# Patient Record
Sex: FEMALE | Race: WHITE | Employment: OTHER | URBAN - METROPOLITAN AREA
[De-identification: names, ages, dates, MRNs, and addresses within clinical notes are randomized per-mention and may not be internally consistent; named-entity substitution may affect disease eponyms.]

---

## 2020-01-01 ENCOUNTER — APPOINTMENT (OUTPATIENT)
Dept: CT IMAGING | Age: 85
DRG: 301 | End: 2020-01-01
Payer: MEDICARE

## 2020-01-01 ENCOUNTER — HOSPITAL ENCOUNTER (INPATIENT)
Age: 85
LOS: 1 days | Discharge: HOSPICE | DRG: 301 | End: 2020-07-08
Attending: EMERGENCY MEDICINE | Admitting: INTERNAL MEDICINE
Payer: MEDICARE

## 2020-01-01 ENCOUNTER — HOSPITAL ENCOUNTER (INPATIENT)
Age: 85
LOS: 2 days | End: 2020-07-10
Attending: INTERNAL MEDICINE | Admitting: INTERNAL MEDICINE

## 2020-01-01 ENCOUNTER — APPOINTMENT (OUTPATIENT)
Dept: GENERAL RADIOLOGY | Age: 85
DRG: 301 | End: 2020-01-01
Attending: EMERGENCY MEDICINE
Payer: MEDICARE

## 2020-01-01 ENCOUNTER — HOSPITAL ENCOUNTER (INPATIENT)
Age: 85
LOS: 1 days | Discharge: ANOTHER HOSPITAL | DRG: 301 | End: 2020-07-07
Attending: INTERNAL MEDICINE | Admitting: INTERNAL MEDICINE
Payer: MEDICARE

## 2020-01-01 ENCOUNTER — HOSPITAL ENCOUNTER (EMERGENCY)
Age: 85
Discharge: HOME | End: 2020-06-16
Attending: EMERGENCY MEDICINE
Payer: MEDICARE

## 2020-01-01 ENCOUNTER — PATIENT OUTREACH (OUTPATIENT)
Dept: CASE MANAGEMENT | Age: 85
End: 2020-01-01

## 2020-01-01 ENCOUNTER — APPOINTMENT (OUTPATIENT)
Dept: GENERAL RADIOLOGY | Age: 85
End: 2020-01-01
Attending: EMERGENCY MEDICINE
Payer: MEDICARE

## 2020-01-01 ENCOUNTER — HOSPITAL ENCOUNTER (OUTPATIENT)
Dept: GENERAL RADIOLOGY | Age: 85
Discharge: HOME | End: 2020-02-13
Attending: INTERNAL MEDICINE
Payer: MEDICARE

## 2020-01-01 ENCOUNTER — HOSPICE ADMISSION (OUTPATIENT)
Dept: HOSPICE | Facility: HOSPICE | Age: 85
End: 2020-01-01
Payer: MEDICARE

## 2020-01-01 VITALS
TEMPERATURE: 98.2 F | SYSTOLIC BLOOD PRESSURE: 117 MMHG | WEIGHT: 180 LBS | RESPIRATION RATE: 16 BRPM | DIASTOLIC BLOOD PRESSURE: 66 MMHG | HEART RATE: 57 BPM | HEIGHT: 62 IN | OXYGEN SATURATION: 95 % | BODY MASS INDEX: 33.13 KG/M2

## 2020-01-01 VITALS
DIASTOLIC BLOOD PRESSURE: 61 MMHG | OXYGEN SATURATION: 93 % | WEIGHT: 179.9 LBS | HEART RATE: 68 BPM | SYSTOLIC BLOOD PRESSURE: 151 MMHG | TEMPERATURE: 98.4 F | HEIGHT: 62 IN | RESPIRATION RATE: 16 BRPM | BODY MASS INDEX: 33.1 KG/M2

## 2020-01-01 VITALS
SYSTOLIC BLOOD PRESSURE: 140 MMHG | TEMPERATURE: 99.9 F | RESPIRATION RATE: 20 BRPM | HEART RATE: 89 BPM | DIASTOLIC BLOOD PRESSURE: 69 MMHG

## 2020-01-01 VITALS
HEART RATE: 68 BPM | BODY MASS INDEX: 28.25 KG/M2 | OXYGEN SATURATION: 98 % | RESPIRATION RATE: 16 BRPM | WEIGHT: 180 LBS | TEMPERATURE: 98.3 F | DIASTOLIC BLOOD PRESSURE: 55 MMHG | HEIGHT: 67 IN | SYSTOLIC BLOOD PRESSURE: 107 MMHG

## 2020-01-01 DIAGNOSIS — R52 PAIN: ICD-10-CM

## 2020-01-01 DIAGNOSIS — R00.1 BRADYCARDIA: ICD-10-CM

## 2020-01-01 DIAGNOSIS — I20.0 INTERMEDIATE CORONARY SYNDROME (HCC): Primary | ICD-10-CM

## 2020-01-01 DIAGNOSIS — S63.502A WRIST SPRAIN, LEFT, INITIAL ENCOUNTER: Primary | ICD-10-CM

## 2020-01-01 LAB
ALBUMIN SERPL-MCNC: 2.8 G/DL (ref 3.2–4.6)
ALBUMIN SERPL-MCNC: 3.3 G/DL (ref 3.2–4.6)
ALBUMIN/GLOB SERPL: 0.8 {RATIO} (ref 1.2–3.5)
ALBUMIN/GLOB SERPL: 0.9 {RATIO} (ref 1.2–3.5)
ALP SERPL-CCNC: 65 U/L (ref 50–130)
ALP SERPL-CCNC: 71 U/L (ref 50–136)
ALT SERPL-CCNC: 19 U/L (ref 12–65)
ALT SERPL-CCNC: 23 U/L (ref 12–65)
ANION GAP SERPL CALC-SCNC: 4 MMOL/L (ref 7–16)
ANION GAP SERPL CALC-SCNC: 6 MMOL/L (ref 7–16)
ANION GAP SERPL CALC-SCNC: 6 MMOL/L (ref 7–16)
APTT PPP: 118.1 SEC (ref 24.3–35.4)
APTT PPP: 33 SEC (ref 24.3–35.4)
AST SERPL-CCNC: 15 U/L (ref 15–37)
AST SERPL-CCNC: 26 U/L (ref 15–37)
ATRIAL RATE: 258 BPM
ATRIAL RATE: 58 BPM
ATRIAL RATE: 79 BPM
BASOPHILS # BLD: 0.1 K/UL (ref 0–0.2)
BASOPHILS # BLD: 0.1 K/UL (ref 0–0.2)
BASOPHILS NFR BLD: 1 % (ref 0–2)
BASOPHILS NFR BLD: 1 % (ref 0–2)
BILIRUB SERPL-MCNC: 0.4 MG/DL (ref 0.2–1.1)
BILIRUB SERPL-MCNC: 0.9 MG/DL (ref 0.2–1.1)
BUN SERPL-MCNC: 12 MG/DL (ref 8–23)
BUN SERPL-MCNC: 14 MG/DL (ref 8–23)
BUN SERPL-MCNC: 14 MG/DL (ref 8–23)
CALCIUM SERPL-MCNC: 8.5 MG/DL (ref 8.3–10.4)
CALCIUM SERPL-MCNC: 8.6 MG/DL (ref 8.3–10.4)
CALCIUM SERPL-MCNC: 8.8 MG/DL (ref 8.3–10.4)
CALCULATED P AXIS, ECG09: 0 DEGREES
CALCULATED P AXIS, ECG09: 52 DEGREES
CALCULATED R AXIS, ECG10: 24 DEGREES
CALCULATED R AXIS, ECG10: 33 DEGREES
CALCULATED R AXIS, ECG10: 7 DEGREES
CALCULATED T AXIS, ECG11: 0 DEGREES
CALCULATED T AXIS, ECG11: 17 DEGREES
CHLORIDE SERPL-SCNC: 107 MMOL/L (ref 98–107)
CHLORIDE SERPL-SCNC: 110 MMOL/L (ref 98–107)
CHLORIDE SERPL-SCNC: 111 MMOL/L (ref 98–107)
CO2 SERPL-SCNC: 23 MMOL/L (ref 21–32)
CO2 SERPL-SCNC: 24 MMOL/L (ref 21–32)
CO2 SERPL-SCNC: 28 MMOL/L (ref 21–32)
CREAT SERPL-MCNC: 0.8 MG/DL (ref 0.6–1)
CREAT SERPL-MCNC: 0.97 MG/DL (ref 0.6–1)
CREAT SERPL-MCNC: 1.01 MG/DL (ref 0.6–1)
D DIMER PPP FEU-MCNC: 7.56 UG/ML(FEU)
DIAGNOSIS, 93000: NORMAL
DIFFERENTIAL METHOD BLD: ABNORMAL
DIFFERENTIAL METHOD BLD: ABNORMAL
EOSINOPHIL # BLD: 0.3 K/UL (ref 0–0.8)
EOSINOPHIL # BLD: 0.4 K/UL (ref 0–0.8)
EOSINOPHIL NFR BLD: 3 % (ref 0.5–7.8)
EOSINOPHIL NFR BLD: 7 % (ref 0.5–7.8)
ERYTHROCYTE [DISTWIDTH] IN BLOOD BY AUTOMATED COUNT: 13 % (ref 11.9–14.6)
ERYTHROCYTE [DISTWIDTH] IN BLOOD BY AUTOMATED COUNT: 13.1 % (ref 11.9–14.6)
ERYTHROCYTE [DISTWIDTH] IN BLOOD BY AUTOMATED COUNT: 13.3 % (ref 11.9–14.6)
GLOBULIN SER CALC-MCNC: 3.6 G/DL (ref 2.3–3.5)
GLOBULIN SER CALC-MCNC: 3.7 G/DL (ref 2.3–3.5)
GLUCOSE SERPL-MCNC: 100 MG/DL (ref 65–100)
GLUCOSE SERPL-MCNC: 111 MG/DL (ref 65–100)
GLUCOSE SERPL-MCNC: 87 MG/DL (ref 65–100)
HCT VFR BLD AUTO: 32.4 % (ref 35.8–46.3)
HCT VFR BLD AUTO: 33.4 % (ref 35.8–46.3)
HCT VFR BLD AUTO: 35.1 % (ref 35.8–46.3)
HGB BLD-MCNC: 10.2 G/DL (ref 11.7–15.4)
HGB BLD-MCNC: 10.6 G/DL (ref 11.7–15.4)
HGB BLD-MCNC: 10.8 G/DL (ref 11.7–15.4)
IMM GRANULOCYTES # BLD AUTO: 0 K/UL (ref 0–0.5)
IMM GRANULOCYTES # BLD AUTO: 0 K/UL (ref 0–0.5)
IMM GRANULOCYTES NFR BLD AUTO: 0 % (ref 0–5)
IMM GRANULOCYTES NFR BLD AUTO: 0 % (ref 0–5)
INR PPP: 1.1
LYMPHOCYTES # BLD: 1.6 K/UL (ref 0.5–4.6)
LYMPHOCYTES # BLD: 1.7 K/UL (ref 0.5–4.6)
LYMPHOCYTES NFR BLD: 19 % (ref 13–44)
LYMPHOCYTES NFR BLD: 27 % (ref 13–44)
MAGNESIUM SERPL-MCNC: 2 MG/DL (ref 1.8–2.4)
MAGNESIUM SERPL-MCNC: 2.2 MG/DL (ref 1.8–2.4)
MCH RBC QN AUTO: 28.8 PG (ref 26.1–32.9)
MCH RBC QN AUTO: 28.8 PG (ref 26.1–32.9)
MCH RBC QN AUTO: 29 PG (ref 26.1–32.9)
MCHC RBC AUTO-ENTMCNC: 30.8 G/DL (ref 31.4–35)
MCHC RBC AUTO-ENTMCNC: 31.5 G/DL (ref 31.4–35)
MCHC RBC AUTO-ENTMCNC: 31.7 G/DL (ref 31.4–35)
MCV RBC AUTO: 91.5 FL (ref 79.6–97.8)
MCV RBC AUTO: 91.5 FL (ref 79.6–97.8)
MCV RBC AUTO: 93.6 FL (ref 79.6–97.8)
MONOCYTES # BLD: 0.6 K/UL (ref 0.1–1.3)
MONOCYTES # BLD: 0.9 K/UL (ref 0.1–1.3)
MONOCYTES NFR BLD: 10 % (ref 4–12)
MONOCYTES NFR BLD: 10 % (ref 4–12)
NEUTS SEG # BLD: 3.5 K/UL (ref 1.7–8.2)
NEUTS SEG # BLD: 5.9 K/UL (ref 1.7–8.2)
NEUTS SEG NFR BLD: 56 % (ref 43–78)
NEUTS SEG NFR BLD: 67 % (ref 43–78)
NRBC # BLD: 0 K/UL (ref 0–0.2)
P-R INTERVAL, ECG05: 206 MS
PLATELET # BLD AUTO: 152 K/UL (ref 150–450)
PLATELET # BLD AUTO: 164 K/UL (ref 150–450)
PLATELET # BLD AUTO: 186 K/UL (ref 150–450)
PMV BLD AUTO: 9.6 FL (ref 9.4–12.3)
PMV BLD AUTO: 9.9 FL (ref 9.4–12.3)
PMV BLD AUTO: 9.9 FL (ref 9.4–12.3)
POTASSIUM SERPL-SCNC: 3.2 MMOL/L (ref 3.5–5.1)
POTASSIUM SERPL-SCNC: 3.8 MMOL/L (ref 3.5–5.1)
POTASSIUM SERPL-SCNC: 3.8 MMOL/L (ref 3.5–5.1)
PROT SERPL-MCNC: 6.5 G/DL (ref 6.3–8.2)
PROT SERPL-MCNC: 6.9 G/DL (ref 6.3–8.2)
PROTHROMBIN TIME: 14.4 SEC (ref 12–14.7)
Q-T INTERVAL, ECG07: 422 MS
Q-T INTERVAL, ECG07: 478 MS
Q-T INTERVAL, ECG07: 610 MS
QRS DURATION, ECG06: 92 MS
QRS DURATION, ECG06: 94 MS
QRS DURATION, ECG06: 96 MS
QTC CALCULATION (BEZET), ECG08: 414 MS
QTC CALCULATION (BEZET), ECG08: 423 MS
QTC CALCULATION (BEZET), ECG08: 484 MS
RBC # BLD AUTO: 3.54 M/UL (ref 4.05–5.2)
RBC # BLD AUTO: 3.65 M/UL (ref 4.05–5.2)
RBC # BLD AUTO: 3.75 M/UL (ref 4.05–5.2)
SODIUM SERPL-SCNC: 139 MMOL/L (ref 136–145)
SODIUM SERPL-SCNC: 140 MMOL/L (ref 136–145)
SODIUM SERPL-SCNC: 140 MMOL/L (ref 136–145)
TROPONIN-HIGH SENSITIVITY: 1328.9 PG/ML (ref 0–14)
TROPONIN-HIGH SENSITIVITY: 15.5 PG/ML (ref 0–14)
TROPONIN-HIGH SENSITIVITY: 19.3 PG/ML (ref 0–14)
TROPONIN-HIGH SENSITIVITY: 548.1 PG/ML (ref 0–14)
VENTRICULAR RATE, ECG03: 38 BPM
VENTRICULAR RATE, ECG03: 47 BPM
VENTRICULAR RATE, ECG03: 58 BPM
WBC # BLD AUTO: 6.3 K/UL (ref 4.3–11.1)
WBC # BLD AUTO: 8 K/UL (ref 4.3–11.1)
WBC # BLD AUTO: 8.7 K/UL (ref 4.3–11.1)

## 2020-01-01 PROCEDURE — 74011250636 HC RX REV CODE- 250/636: Performed by: NURSE PRACTITIONER

## 2020-01-01 PROCEDURE — 83735 ASSAY OF MAGNESIUM: CPT

## 2020-01-01 PROCEDURE — 96376 TX/PRO/DX INJ SAME DRUG ADON: CPT

## 2020-01-01 PROCEDURE — G0299 HHS/HOSPICE OF RN EA 15 MIN: HCPCS

## 2020-01-01 PROCEDURE — 85025 COMPLETE CBC W/AUTO DIFF WBC: CPT

## 2020-01-01 PROCEDURE — 36415 COLL VENOUS BLD VENIPUNCTURE: CPT

## 2020-01-01 PROCEDURE — 71046 X-RAY EXAM CHEST 2 VIEWS: CPT

## 2020-01-01 PROCEDURE — 74011250636 HC RX REV CODE- 250/636: Performed by: FAMILY MEDICINE

## 2020-01-01 PROCEDURE — 65270000029 HC RM PRIVATE

## 2020-01-01 PROCEDURE — 74011250637 HC RX REV CODE- 250/637: Performed by: NURSE PRACTITIONER

## 2020-01-01 PROCEDURE — 94640 AIRWAY INHALATION TREATMENT: CPT

## 2020-01-01 PROCEDURE — 71260 CT THORAX DX C+: CPT

## 2020-01-01 PROCEDURE — 99218 HC RM OBSERVATION: CPT

## 2020-01-01 PROCEDURE — 99285 EMERGENCY DEPT VISIT HI MDM: CPT

## 2020-01-01 PROCEDURE — G0156 HHCP-SVS OF AIDE,EA 15 MIN: HCPCS

## 2020-01-01 PROCEDURE — 73560 X-RAY EXAM OF KNEE 1 OR 2: CPT

## 2020-01-01 PROCEDURE — 84484 ASSAY OF TROPONIN QUANT: CPT

## 2020-01-01 PROCEDURE — 85027 COMPLETE CBC AUTOMATED: CPT

## 2020-01-01 PROCEDURE — 74011250637 HC RX REV CODE- 250/637: Performed by: FAMILY MEDICINE

## 2020-01-01 PROCEDURE — 73110 X-RAY EXAM OF WRIST: CPT

## 2020-01-01 PROCEDURE — 74011000250 HC RX REV CODE- 250: Performed by: INTERNAL MEDICINE

## 2020-01-01 PROCEDURE — 74011250637 HC RX REV CODE- 250/637: Performed by: INTERNAL MEDICINE

## 2020-01-01 PROCEDURE — 96366 THER/PROPH/DIAG IV INF ADDON: CPT

## 2020-01-01 PROCEDURE — 0656 HSPC GENERAL INPATIENT

## 2020-01-01 PROCEDURE — 87635 SARS-COV-2 COVID-19 AMP PRB: CPT

## 2020-01-01 PROCEDURE — 74011000250 HC RX REV CODE- 250: Performed by: NURSE PRACTITIONER

## 2020-01-01 PROCEDURE — 80053 COMPREHEN METABOLIC PANEL: CPT

## 2020-01-01 PROCEDURE — 74011250636 HC RX REV CODE- 250/636: Performed by: INTERNAL MEDICINE

## 2020-01-01 PROCEDURE — 74011000258 HC RX REV CODE- 258: Performed by: FAMILY MEDICINE

## 2020-01-01 PROCEDURE — 73502 X-RAY EXAM HIP UNI 2-3 VIEWS: CPT

## 2020-01-01 PROCEDURE — 96374 THER/PROPH/DIAG INJ IV PUSH: CPT

## 2020-01-01 PROCEDURE — 85379 FIBRIN DEGRADATION QUANT: CPT

## 2020-01-01 PROCEDURE — 80048 BASIC METABOLIC PNL TOTAL CA: CPT

## 2020-01-01 PROCEDURE — G0155 HHCP-SVS OF CSW,EA 15 MIN: HCPCS

## 2020-01-01 PROCEDURE — 74011250636 HC RX REV CODE- 250/636: Performed by: EMERGENCY MEDICINE

## 2020-01-01 PROCEDURE — 77010033678 HC OXYGEN DAILY

## 2020-01-01 PROCEDURE — 85730 THROMBOPLASTIN TIME PARTIAL: CPT

## 2020-01-01 PROCEDURE — 99282 EMERGENCY DEPT VISIT SF MDM: CPT

## 2020-01-01 PROCEDURE — 93005 ELECTROCARDIOGRAM TRACING: CPT | Performed by: EMERGENCY MEDICINE

## 2020-01-01 PROCEDURE — 99283 EMERGENCY DEPT VISIT LOW MDM: CPT

## 2020-01-01 PROCEDURE — 94760 N-INVAS EAR/PLS OXIMETRY 1: CPT

## 2020-01-01 PROCEDURE — 74011250637 HC RX REV CODE- 250/637: Performed by: EMERGENCY MEDICINE

## 2020-01-01 PROCEDURE — 3336500001 HSPC ELECTION

## 2020-01-01 PROCEDURE — 74011636320 HC RX REV CODE- 636/320: Performed by: FAMILY MEDICINE

## 2020-01-01 PROCEDURE — 73630 X-RAY EXAM OF FOOT: CPT

## 2020-01-01 PROCEDURE — 85610 PROTHROMBIN TIME: CPT

## 2020-01-01 PROCEDURE — 96375 TX/PRO/DX INJ NEW DRUG ADDON: CPT

## 2020-01-01 PROCEDURE — 96365 THER/PROPH/DIAG IV INF INIT: CPT

## 2020-01-01 RX ORDER — ACETAMINOPHEN 325 MG/1
650 TABLET ORAL
Status: CANCELLED | OUTPATIENT
Start: 2020-01-01

## 2020-01-01 RX ORDER — ISOSORBIDE MONONITRATE 30 MG/1
15 TABLET, EXTENDED RELEASE ORAL
COMMUNITY
Start: 2019-01-01 | End: 2020-10-09

## 2020-01-01 RX ORDER — SODIUM CHLORIDE 0.9 % (FLUSH) 0.9 %
3 SYRINGE (ML) INJECTION AS NEEDED
Status: DISCONTINUED | OUTPATIENT
Start: 2020-01-01 | End: 2020-01-01 | Stop reason: HOSPADM

## 2020-01-01 RX ORDER — FLUTICASONE PROPIONATE 110 UG/1
1 AEROSOL, METERED RESPIRATORY (INHALATION)
Status: DISCONTINUED | OUTPATIENT
Start: 2020-01-01 | End: 2020-01-01 | Stop reason: HOSPADM

## 2020-01-01 RX ORDER — MORPHINE SULFATE 2 MG/ML
2 INJECTION, SOLUTION INTRAMUSCULAR; INTRAVENOUS
Status: DISCONTINUED | OUTPATIENT
Start: 2020-01-01 | End: 2020-01-01

## 2020-01-01 RX ORDER — BUPROPION HYDROCHLORIDE 150 MG/1
150 TABLET, EXTENDED RELEASE ORAL DAILY
Status: DISCONTINUED | OUTPATIENT
Start: 2020-01-01 | End: 2020-01-01 | Stop reason: HOSPADM

## 2020-01-01 RX ORDER — SODIUM CHLORIDE 9 MG/ML
75 INJECTION, SOLUTION INTRAVENOUS CONTINUOUS
Status: DISCONTINUED | OUTPATIENT
Start: 2020-01-01 | End: 2020-01-01

## 2020-01-01 RX ORDER — ASPIRIN 325 MG
TABLET ORAL
COMMUNITY
End: 2020-01-01

## 2020-01-01 RX ORDER — METOPROLOL TARTRATE 25 MG/1
12.5 TABLET, FILM COATED ORAL EVERY 12 HOURS
Status: DISCONTINUED | OUTPATIENT
Start: 2020-01-01 | End: 2020-01-01

## 2020-01-01 RX ORDER — OXYBUTYNIN CHLORIDE 5 MG/1
5 TABLET ORAL AS NEEDED
COMMUNITY

## 2020-01-01 RX ORDER — DULOXETIN HYDROCHLORIDE 60 MG/1
60 CAPSULE, DELAYED RELEASE ORAL DAILY
COMMUNITY
Start: 2017-07-18

## 2020-01-01 RX ORDER — GABAPENTIN 300 MG/1
CAPSULE ORAL
COMMUNITY
Start: 2017-03-08

## 2020-01-01 RX ORDER — HEPARIN SODIUM 5000 [USP'U]/100ML
12-25 INJECTION, SOLUTION INTRAVENOUS
Status: DISCONTINUED | OUTPATIENT
Start: 2020-01-01 | End: 2020-01-01

## 2020-01-01 RX ORDER — TRAMADOL HYDROCHLORIDE 50 MG/1
50 TABLET ORAL
Status: DISCONTINUED | OUTPATIENT
Start: 2020-01-01 | End: 2020-01-01

## 2020-01-01 RX ORDER — HYDROCODONE BITARTRATE AND ACETAMINOPHEN 10; 325 MG/1; MG/1
1 TABLET ORAL
Status: DISCONTINUED | OUTPATIENT
Start: 2020-01-01 | End: 2020-01-01 | Stop reason: HOSPADM

## 2020-01-01 RX ORDER — METOPROLOL TARTRATE 25 MG/1
12.5 TABLET, FILM COATED ORAL EVERY 12 HOURS
Status: CANCELLED | OUTPATIENT
Start: 2020-01-01

## 2020-01-01 RX ORDER — SODIUM CHLORIDE 9 MG/ML
75 INJECTION, SOLUTION INTRAVENOUS CONTINUOUS
Status: CANCELLED | OUTPATIENT
Start: 2020-01-01

## 2020-01-01 RX ORDER — LABETALOL HYDROCHLORIDE 5 MG/ML
10 INJECTION, SOLUTION INTRAVENOUS
Status: DISCONTINUED | OUTPATIENT
Start: 2020-01-01 | End: 2020-01-01

## 2020-01-01 RX ORDER — FACIAL-BODY WIPES
10 EACH TOPICAL AS NEEDED
Status: DISCONTINUED | OUTPATIENT
Start: 2020-01-01 | End: 2020-01-01 | Stop reason: HOSPADM

## 2020-01-01 RX ORDER — GABAPENTIN 300 MG/1
300 CAPSULE ORAL 2 TIMES DAILY
Status: DISCONTINUED | OUTPATIENT
Start: 2020-01-01 | End: 2020-01-01 | Stop reason: HOSPADM

## 2020-01-01 RX ORDER — METOPROLOL TARTRATE 25 MG/1
12.5 TABLET, FILM COATED ORAL 2 TIMES DAILY
Status: DISCONTINUED | OUTPATIENT
Start: 2020-01-01 | End: 2020-01-01

## 2020-01-01 RX ORDER — GUAIFENESIN 100 MG/5ML
81 LIQUID (ML) ORAL DAILY
Status: DISCONTINUED | OUTPATIENT
Start: 2020-01-01 | End: 2020-01-01 | Stop reason: HOSPADM

## 2020-01-01 RX ORDER — ALBUTEROL SULFATE 0.83 MG/ML
2.5 SOLUTION RESPIRATORY (INHALATION)
Status: DISCONTINUED | OUTPATIENT
Start: 2020-01-01 | End: 2020-01-01

## 2020-01-01 RX ORDER — TRAMADOL HYDROCHLORIDE 50 MG/1
50 TABLET ORAL
Status: DISCONTINUED | OUTPATIENT
Start: 2020-01-01 | End: 2020-01-01 | Stop reason: HOSPADM

## 2020-01-01 RX ORDER — PANTOPRAZOLE SODIUM 40 MG/1
TABLET, DELAYED RELEASE ORAL
COMMUNITY
Start: 2017-03-23

## 2020-01-01 RX ORDER — ACETAMINOPHEN 325 MG/1
650 TABLET ORAL
Status: DISCONTINUED | OUTPATIENT
Start: 2020-01-01 | End: 2020-01-01 | Stop reason: HOSPADM

## 2020-01-01 RX ORDER — LEVALBUTEROL INHALATION SOLUTION 1.25 MG/3ML
1.25 SOLUTION RESPIRATORY (INHALATION)
Status: DISCONTINUED | OUTPATIENT
Start: 2020-01-01 | End: 2020-01-01 | Stop reason: HOSPADM

## 2020-01-01 RX ORDER — ACETAMINOPHEN 650 MG/1
650 SUPPOSITORY RECTAL
Status: DISCONTINUED | OUTPATIENT
Start: 2020-01-01 | End: 2020-01-01 | Stop reason: HOSPADM

## 2020-01-01 RX ORDER — OXYBUTYNIN CHLORIDE 5 MG/1
5 TABLET ORAL 2 TIMES DAILY
Status: DISCONTINUED | OUTPATIENT
Start: 2020-01-01 | End: 2020-01-01 | Stop reason: HOSPADM

## 2020-01-01 RX ORDER — BUDESONIDE 0.5 MG/2ML
500 INHALANT ORAL
Status: CANCELLED | OUTPATIENT
Start: 2020-01-01

## 2020-01-01 RX ORDER — MORPHINE SULFATE 2 MG/ML
2 INJECTION, SOLUTION INTRAMUSCULAR; INTRAVENOUS EVERY 6 HOURS
Status: DISCONTINUED | OUTPATIENT
Start: 2020-01-01 | End: 2020-01-01 | Stop reason: HOSPADM

## 2020-01-01 RX ORDER — ONDANSETRON 2 MG/ML
4 INJECTION INTRAMUSCULAR; INTRAVENOUS
Status: CANCELLED | OUTPATIENT
Start: 2020-01-01

## 2020-01-01 RX ORDER — TRAMADOL HYDROCHLORIDE 50 MG/1
50 TABLET ORAL
Status: CANCELLED | OUTPATIENT
Start: 2020-01-01

## 2020-01-01 RX ORDER — BUTALBITAL, ACETAMINOPHEN AND CAFFEINE 50; 325; 40 MG/1; MG/1; MG/1
1 TABLET ORAL
COMMUNITY
Start: 2017-03-14

## 2020-01-01 RX ORDER — ATORVASTATIN CALCIUM 40 MG/1
40 TABLET, FILM COATED ORAL
Status: DISCONTINUED | OUTPATIENT
Start: 2020-01-01 | End: 2020-01-01 | Stop reason: HOSPADM

## 2020-01-01 RX ORDER — HALOPERIDOL 5 MG/ML
2 INJECTION INTRAMUSCULAR
Status: DISCONTINUED | OUTPATIENT
Start: 2020-01-01 | End: 2020-01-01 | Stop reason: HOSPADM

## 2020-01-01 RX ORDER — FUROSEMIDE 40 MG/1
40 TABLET ORAL DAILY
Status: DISCONTINUED | OUTPATIENT
Start: 2020-01-01 | End: 2020-01-01 | Stop reason: HOSPADM

## 2020-01-01 RX ORDER — ONDANSETRON 2 MG/ML
4 INJECTION INTRAMUSCULAR; INTRAVENOUS
Status: DISCONTINUED | OUTPATIENT
Start: 2020-01-01 | End: 2020-01-01 | Stop reason: HOSPADM

## 2020-01-01 RX ORDER — LORATADINE 10 MG/1
10 TABLET ORAL DAILY
Status: DISCONTINUED | OUTPATIENT
Start: 2020-01-01 | End: 2020-01-01 | Stop reason: HOSPADM

## 2020-01-01 RX ORDER — METOPROLOL TARTRATE 25 MG/1
12.5 TABLET, FILM COATED ORAL EVERY 12 HOURS
Status: DISCONTINUED | OUTPATIENT
Start: 2020-01-01 | End: 2020-01-01 | Stop reason: HOSPADM

## 2020-01-01 RX ORDER — SODIUM CHLORIDE 9 MG/ML
50 INJECTION, SOLUTION INTRAVENOUS CONTINUOUS
Status: DISCONTINUED | OUTPATIENT
Start: 2020-01-01 | End: 2020-01-01

## 2020-01-01 RX ORDER — ATORVASTATIN CALCIUM 40 MG/1
40 TABLET, FILM COATED ORAL
Status: CANCELLED | OUTPATIENT
Start: 2020-01-01

## 2020-01-01 RX ORDER — LORATADINE 10 MG/1
10 TABLET ORAL DAILY
Status: DISCONTINUED | OUTPATIENT
Start: 2020-01-01 | End: 2020-01-01

## 2020-01-01 RX ORDER — LEVALBUTEROL INHALATION SOLUTION 1.25 MG/3ML
1.25 SOLUTION RESPIRATORY (INHALATION)
Qty: 20 NEBULE | Refills: 0 | Status: SHIPPED
Start: 2020-01-01

## 2020-01-01 RX ORDER — FUROSEMIDE 40 MG/1
TABLET ORAL
COMMUNITY

## 2020-01-01 RX ORDER — HEPARIN SODIUM 5000 [USP'U]/ML
5000 INJECTION, SOLUTION INTRAVENOUS; SUBCUTANEOUS ONCE
Status: COMPLETED | OUTPATIENT
Start: 2020-01-01 | End: 2020-01-01

## 2020-01-01 RX ORDER — POTASSIUM CHLORIDE 1500 MG/1
TABLET, FILM COATED, EXTENDED RELEASE ORAL
COMMUNITY
Start: 2016-01-14

## 2020-01-01 RX ORDER — MORPHINE SULFATE 2 MG/ML
2 INJECTION, SOLUTION INTRAMUSCULAR; INTRAVENOUS
Status: CANCELLED | OUTPATIENT
Start: 2020-01-01

## 2020-01-01 RX ORDER — CLONIDINE 0.1 MG/24H
1 PATCH, EXTENDED RELEASE TRANSDERMAL
Status: DISCONTINUED | OUTPATIENT
Start: 2020-01-01 | End: 2020-01-01 | Stop reason: HOSPADM

## 2020-01-01 RX ORDER — AMLODIPINE BESYLATE 10 MG/1
10 TABLET ORAL DAILY
Status: DISCONTINUED | OUTPATIENT
Start: 2020-01-01 | End: 2020-01-01 | Stop reason: HOSPADM

## 2020-01-01 RX ORDER — SODIUM CHLORIDE 0.9 % (FLUSH) 0.9 %
10 SYRINGE (ML) INJECTION
Status: COMPLETED | OUTPATIENT
Start: 2020-01-01 | End: 2020-01-01

## 2020-01-01 RX ORDER — ONDANSETRON 2 MG/ML
4 INJECTION INTRAMUSCULAR; INTRAVENOUS
Status: DISCONTINUED | OUTPATIENT
Start: 2020-01-01 | End: 2020-01-01

## 2020-01-01 RX ORDER — BISACODYL 5 MG
5 TABLET, DELAYED RELEASE (ENTERIC COATED) ORAL DAILY PRN
Status: DISCONTINUED | OUTPATIENT
Start: 2020-01-01 | End: 2020-01-01 | Stop reason: HOSPADM

## 2020-01-01 RX ORDER — LABETALOL HYDROCHLORIDE 5 MG/ML
10 INJECTION, SOLUTION INTRAVENOUS
Status: DISCONTINUED | OUTPATIENT
Start: 2020-01-01 | End: 2020-01-01 | Stop reason: HOSPADM

## 2020-01-01 RX ORDER — NITROGLYCERIN 0.4 MG/1
0.4 TABLET SUBLINGUAL
COMMUNITY
Start: 2017-07-18

## 2020-01-01 RX ORDER — ACETAMINOPHEN 500 MG
TABLET ORAL
Status: ON HOLD | COMMUNITY
End: 2020-01-01

## 2020-01-01 RX ORDER — NALOXONE HYDROCHLORIDE 0.4 MG/ML
0.4 INJECTION, SOLUTION INTRAMUSCULAR; INTRAVENOUS; SUBCUTANEOUS AS NEEDED
Status: DISCONTINUED | OUTPATIENT
Start: 2020-01-01 | End: 2020-01-01 | Stop reason: HOSPADM

## 2020-01-01 RX ORDER — LORAZEPAM 0.5 MG/1
0.5 TABLET ORAL
Status: DISCONTINUED | OUTPATIENT
Start: 2020-01-01 | End: 2020-01-01 | Stop reason: HOSPADM

## 2020-01-01 RX ORDER — BUDESONIDE 0.5 MG/2ML
500 INHALANT ORAL
Status: DISCONTINUED | OUTPATIENT
Start: 2020-01-01 | End: 2020-01-01 | Stop reason: HOSPADM

## 2020-01-01 RX ORDER — MORPHINE SULFATE 4 MG/ML
4 INJECTION INTRAVENOUS
Status: DISCONTINUED | OUTPATIENT
Start: 2020-01-01 | End: 2020-01-01 | Stop reason: HOSPADM

## 2020-01-01 RX ORDER — GABAPENTIN 300 MG/1
300 CAPSULE ORAL 2 TIMES DAILY
Status: CANCELLED | OUTPATIENT
Start: 2020-01-01

## 2020-01-01 RX ORDER — HEPARIN SODIUM 5000 [USP'U]/100ML
12-25 INJECTION, SOLUTION INTRAVENOUS
Status: DISCONTINUED | OUTPATIENT
Start: 2020-01-01 | End: 2020-01-01 | Stop reason: HOSPADM

## 2020-01-01 RX ORDER — LABETALOL HYDROCHLORIDE 5 MG/ML
10 INJECTION, SOLUTION INTRAVENOUS
Status: CANCELLED | OUTPATIENT
Start: 2020-01-01

## 2020-01-01 RX ORDER — LANOLIN ALCOHOL/MO/W.PET/CERES
1 CREAM (GRAM) TOPICAL
COMMUNITY

## 2020-01-01 RX ORDER — BUPROPION HYDROCHLORIDE 150 MG/1
150 TABLET, EXTENDED RELEASE ORAL DAILY
Status: DISCONTINUED | OUTPATIENT
Start: 2020-01-01 | End: 2020-01-01

## 2020-01-01 RX ORDER — SUCRALFATE 1 G/1
TABLET ORAL
COMMUNITY
Start: 2017-02-27

## 2020-01-01 RX ORDER — BUPROPION HYDROCHLORIDE 150 MG/1
150 TABLET, EXTENDED RELEASE ORAL 2 TIMES DAILY
Status: DISCONTINUED | OUTPATIENT
Start: 2020-01-01 | End: 2020-01-01 | Stop reason: HOSPADM

## 2020-01-01 RX ORDER — AMLODIPINE BESYLATE 10 MG/1
TABLET ORAL
COMMUNITY
Start: 2017-05-12

## 2020-01-01 RX ORDER — GUAIFENESIN 100 MG/5ML
81 LIQUID (ML) ORAL DAILY
Qty: 30 TAB | Refills: 0 | Status: SHIPPED
Start: 2020-01-01

## 2020-01-01 RX ORDER — ENOXAPARIN SODIUM 100 MG/ML
30 INJECTION SUBCUTANEOUS EVERY 24 HOURS
Status: DISCONTINUED | OUTPATIENT
Start: 2020-01-01 | End: 2020-01-01 | Stop reason: SDUPTHER

## 2020-01-01 RX ORDER — HALOPERIDOL 5 MG/ML
2 INJECTION INTRAMUSCULAR
Status: COMPLETED | OUTPATIENT
Start: 2020-01-01 | End: 2020-01-01

## 2020-01-01 RX ORDER — METOPROLOL SUCCINATE 25 MG/1
12.5 TABLET, EXTENDED RELEASE ORAL DAILY
Status: ON HOLD | COMMUNITY
Start: 2020-01-01 | End: 2020-01-01

## 2020-01-01 RX ORDER — METOPROLOL TARTRATE 25 MG/1
TABLET, FILM COATED ORAL
COMMUNITY
Start: 2017-07-18

## 2020-01-01 RX ORDER — AMLODIPINE BESYLATE 5 MG/1
5 TABLET ORAL DAILY
Status: DISCONTINUED | OUTPATIENT
Start: 2020-01-01 | End: 2020-01-01 | Stop reason: HOSPADM

## 2020-01-01 RX ORDER — ALBUTEROL SULFATE 0.83 MG/ML
2.5 SOLUTION RESPIRATORY (INHALATION)
Status: DISCONTINUED | OUTPATIENT
Start: 2020-01-01 | End: 2020-01-01 | Stop reason: HOSPADM

## 2020-01-01 RX ORDER — BUPROPION HYDROCHLORIDE 150 MG/1
150 TABLET, EXTENDED RELEASE ORAL DAILY
Status: CANCELLED | OUTPATIENT
Start: 2020-01-01

## 2020-01-01 RX ORDER — ACETAMINOPHEN 500 MG
1 TABLET ORAL DAILY
COMMUNITY

## 2020-01-01 RX ORDER — TRAMADOL HYDROCHLORIDE 50 MG/1
50 TABLET ORAL
COMMUNITY
Start: 2020-01-01

## 2020-01-01 RX ORDER — MORPHINE SULFATE 10 MG/ML
5 INJECTION, SOLUTION INTRAMUSCULAR; INTRAVENOUS
Status: DISCONTINUED | OUTPATIENT
Start: 2020-01-01 | End: 2020-01-01 | Stop reason: HOSPADM

## 2020-01-01 RX ORDER — EXEMESTANE 25 MG/1
25 TABLET ORAL DAILY
COMMUNITY
Start: 2020-01-01

## 2020-01-01 RX ORDER — SODIUM CHLORIDE 0.9 % (FLUSH) 0.9 %
5-40 SYRINGE (ML) INJECTION AS NEEDED
Status: DISCONTINUED | OUTPATIENT
Start: 2020-01-01 | End: 2020-01-01 | Stop reason: HOSPADM

## 2020-01-01 RX ORDER — ATORVASTATIN CALCIUM 40 MG/1
40 TABLET, FILM COATED ORAL
Status: DISCONTINUED | OUTPATIENT
Start: 2020-01-01 | End: 2020-01-01

## 2020-01-01 RX ORDER — ASPIRIN 325 MG
325 TABLET ORAL DAILY
Status: DISCONTINUED | OUTPATIENT
Start: 2020-01-01 | End: 2020-01-01 | Stop reason: HOSPADM

## 2020-01-01 RX ORDER — BUDESONIDE AND FORMOTEROL FUMARATE DIHYDRATE 160; 4.5 UG/1; UG/1
2 AEROSOL RESPIRATORY (INHALATION) 2 TIMES DAILY
COMMUNITY
Start: 2020-01-01

## 2020-01-01 RX ORDER — PANTOPRAZOLE SODIUM 40 MG/1
40 TABLET, DELAYED RELEASE ORAL
Status: DISCONTINUED | OUTPATIENT
Start: 2020-01-01 | End: 2020-01-01 | Stop reason: HOSPADM

## 2020-01-01 RX ORDER — NITROGLYCERIN 0.4 MG/1
0.4 TABLET SUBLINGUAL
Status: DISCONTINUED | OUTPATIENT
Start: 2020-01-01 | End: 2020-01-01 | Stop reason: HOSPADM

## 2020-01-01 RX ORDER — OMEPRAZOLE 40 MG/1
CAPSULE, DELAYED RELEASE ORAL
Status: ON HOLD | COMMUNITY
Start: 2017-05-12 | End: 2020-01-01

## 2020-01-01 RX ORDER — BUDESONIDE 0.5 MG/2ML
500 INHALANT ORAL
Status: DISCONTINUED | OUTPATIENT
Start: 2020-01-01 | End: 2020-01-01

## 2020-01-01 RX ORDER — HEPARIN SODIUM 5000 [USP'U]/ML
80 INJECTION, SOLUTION INTRAVENOUS; SUBCUTANEOUS
Status: DISCONTINUED | OUTPATIENT
Start: 2020-01-01 | End: 2020-01-01 | Stop reason: SDUPTHER

## 2020-01-01 RX ORDER — ACETAMINOPHEN 325 MG/1
650 TABLET ORAL
Status: DISCONTINUED | OUTPATIENT
Start: 2020-01-01 | End: 2020-01-01

## 2020-01-01 RX ORDER — SODIUM CHLORIDE 0.9 % (FLUSH) 0.9 %
5-40 SYRINGE (ML) INJECTION EVERY 8 HOURS
Status: DISCONTINUED | OUTPATIENT
Start: 2020-01-01 | End: 2020-01-01 | Stop reason: HOSPADM

## 2020-01-01 RX ORDER — ISOSORBIDE MONONITRATE 30 MG/1
15 TABLET, EXTENDED RELEASE ORAL DAILY
Status: DISCONTINUED | OUTPATIENT
Start: 2020-01-01 | End: 2020-01-01 | Stop reason: HOSPADM

## 2020-01-01 RX ORDER — BUPROPION HYDROCHLORIDE 150 MG/1
TABLET ORAL
COMMUNITY
Start: 2017-05-22

## 2020-01-01 RX ORDER — MORPHINE SULFATE 2 MG/ML
2 INJECTION, SOLUTION INTRAMUSCULAR; INTRAVENOUS
Status: DISCONTINUED | OUTPATIENT
Start: 2020-01-01 | End: 2020-01-01 | Stop reason: HOSPADM

## 2020-01-01 RX ORDER — MORPHINE SULFATE 4 MG/ML
4 INJECTION INTRAVENOUS
Status: COMPLETED | OUTPATIENT
Start: 2020-01-01 | End: 2020-01-01

## 2020-01-01 RX ORDER — GABAPENTIN 300 MG/1
300 CAPSULE ORAL 2 TIMES DAILY
Status: DISCONTINUED | OUTPATIENT
Start: 2020-01-01 | End: 2020-01-01

## 2020-01-01 RX ORDER — GLYCOPYRROLATE 0.2 MG/ML
0.2 INJECTION INTRAMUSCULAR; INTRAVENOUS
Status: DISCONTINUED | OUTPATIENT
Start: 2020-01-01 | End: 2020-01-01 | Stop reason: HOSPADM

## 2020-01-01 RX ORDER — POTASSIUM CHLORIDE 20 MEQ/1
40 TABLET, EXTENDED RELEASE ORAL
Status: DISCONTINUED | OUTPATIENT
Start: 2020-01-01 | End: 2020-01-01 | Stop reason: HOSPADM

## 2020-01-01 RX ORDER — SODIUM CHLORIDE 9 MG/ML
1000 INJECTION, SOLUTION INTRAVENOUS CONTINUOUS
Status: DISPENSED | OUTPATIENT
Start: 2020-01-01 | End: 2020-01-01

## 2020-01-01 RX ORDER — ATORVASTATIN CALCIUM 40 MG/1
TABLET, FILM COATED ORAL
COMMUNITY
Start: 2017-01-26

## 2020-01-01 RX ORDER — LORATADINE 10 MG/1
10 TABLET ORAL DAILY
Status: CANCELLED | OUTPATIENT
Start: 2020-01-01

## 2020-01-01 RX ORDER — SODIUM CHLORIDE 0.9 % (FLUSH) 0.9 %
3 SYRINGE (ML) INJECTION EVERY 12 HOURS
Status: DISCONTINUED | OUTPATIENT
Start: 2020-01-01 | End: 2020-01-01 | Stop reason: HOSPADM

## 2020-01-01 RX ORDER — CETIRIZINE HCL 10 MG
TABLET ORAL
COMMUNITY
Start: 2017-04-21

## 2020-01-01 RX ADMIN — SODIUM CHLORIDE, PRESERVATIVE FREE 3 ML: 5 INJECTION INTRAVENOUS at 05:24

## 2020-01-01 RX ADMIN — SODIUM CHLORIDE, PRESERVATIVE FREE 3 ML: 5 INJECTION INTRAVENOUS at 08:04

## 2020-01-01 RX ADMIN — FLUTICASONE PROPIONATE 1 PUFF: 110 AEROSOL, METERED RESPIRATORY (INHALATION) at 22:07

## 2020-01-01 RX ADMIN — HALOPERIDOL LACTATE 2 MG: 5 INJECTION, SOLUTION INTRAMUSCULAR at 08:59

## 2020-01-01 RX ADMIN — SODIUM CHLORIDE, PRESERVATIVE FREE 3 ML: 5 INJECTION INTRAVENOUS at 18:30

## 2020-01-01 RX ADMIN — SODIUM CHLORIDE, PRESERVATIVE FREE 3 ML: 5 INJECTION INTRAVENOUS at 12:27

## 2020-01-01 RX ADMIN — GLYCOPYRROLATE 0.2 MG: 0.2 INJECTION, SOLUTION INTRAMUSCULAR; INTRAVENOUS at 08:59

## 2020-01-01 RX ADMIN — AMLODIPINE BESYLATE 10 MG: 10 TABLET ORAL at 09:30

## 2020-01-01 RX ADMIN — BUDESONIDE 500 MCG: 0.5 INHALANT RESPIRATORY (INHALATION) at 07:42

## 2020-01-01 RX ADMIN — GABAPENTIN 300 MG: 300 CAPSULE ORAL at 09:30

## 2020-01-01 RX ADMIN — MORPHINE SULFATE 5 MG: 10 INJECTION, SOLUTION INTRAMUSCULAR; INTRAVENOUS at 08:11

## 2020-01-01 RX ADMIN — ACETAMINOPHEN 650 MG: 325 TABLET, FILM COATED ORAL at 03:51

## 2020-01-01 RX ADMIN — ATORVASTATIN CALCIUM 40 MG: 40 TABLET, FILM COATED ORAL at 20:45

## 2020-01-01 RX ADMIN — NITROGLYCERIN 0.4 MG: 0.4 TABLET SUBLINGUAL at 18:08

## 2020-01-01 RX ADMIN — GLYCOPYRROLATE 0.2 MG: 0.2 INJECTION, SOLUTION INTRAMUSCULAR; INTRAVENOUS at 10:30

## 2020-01-01 RX ADMIN — GLYCOPYRROLATE 0.2 MG: 0.2 INJECTION, SOLUTION INTRAMUSCULAR; INTRAVENOUS at 05:47

## 2020-01-01 RX ADMIN — OXYBUTYNIN CHLORIDE 5 MG: 5 TABLET ORAL at 09:30

## 2020-01-01 RX ADMIN — SODIUM CHLORIDE, PRESERVATIVE FREE 3 ML: 5 INJECTION INTRAVENOUS at 10:30

## 2020-01-01 RX ADMIN — ATORVASTATIN CALCIUM 40 MG: 40 TABLET, FILM COATED ORAL at 22:21

## 2020-01-01 RX ADMIN — GABAPENTIN 300 MG: 300 CAPSULE ORAL at 08:52

## 2020-01-01 RX ADMIN — SODIUM CHLORIDE, PRESERVATIVE FREE 3 ML: 5 INJECTION INTRAVENOUS at 00:15

## 2020-01-01 RX ADMIN — ISOSORBIDE MONONITRATE 15 MG: 30 TABLET, EXTENDED RELEASE ORAL at 09:30

## 2020-01-01 RX ADMIN — LORATADINE 10 MG: 10 TABLET ORAL at 09:30

## 2020-01-01 RX ADMIN — FLUTICASONE PROPIONATE 1 PUFF: 110 AEROSOL, METERED RESPIRATORY (INHALATION) at 09:39

## 2020-01-01 RX ADMIN — ALBUTEROL SULFATE 2.5 MG: 2.5 SOLUTION RESPIRATORY (INHALATION) at 09:39

## 2020-01-01 RX ADMIN — SODIUM CHLORIDE, PRESERVATIVE FREE 3 ML: 5 INJECTION INTRAVENOUS at 21:17

## 2020-01-01 RX ADMIN — MORPHINE SULFATE 2 MG: 2 INJECTION, SOLUTION INTRAMUSCULAR; INTRAVENOUS at 00:15

## 2020-01-01 RX ADMIN — MORPHINE SULFATE 2 MG: 2 INJECTION, SOLUTION INTRAMUSCULAR; INTRAVENOUS at 23:57

## 2020-01-01 RX ADMIN — AMLODIPINE BESYLATE 5 MG: 5 TABLET ORAL at 08:52

## 2020-01-01 RX ADMIN — MORPHINE SULFATE 2 MG: 2 INJECTION, SOLUTION INTRAMUSCULAR; INTRAVENOUS at 05:24

## 2020-01-01 RX ADMIN — BUPROPION HYDROCHLORIDE 150 MG: 150 TABLET, EXTENDED RELEASE ORAL at 08:52

## 2020-01-01 RX ADMIN — GLYCOPYRROLATE 0.2 MG: 0.2 INJECTION, SOLUTION INTRAMUSCULAR; INTRAVENOUS at 21:16

## 2020-01-01 RX ADMIN — LORATADINE 10 MG: 10 TABLET ORAL at 08:52

## 2020-01-01 RX ADMIN — SODIUM CHLORIDE, PRESERVATIVE FREE 3 ML: 5 INJECTION INTRAVENOUS at 23:57

## 2020-01-01 RX ADMIN — PANTOPRAZOLE SODIUM 40 MG: 40 TABLET, DELAYED RELEASE ORAL at 06:27

## 2020-01-01 RX ADMIN — ASPIRIN 325 MG: 325 TABLET, FILM COATED ORAL at 09:30

## 2020-01-01 RX ADMIN — HALOPERIDOL LACTATE 2 MG: 5 INJECTION, SOLUTION INTRAMUSCULAR at 13:46

## 2020-01-01 RX ADMIN — MORPHINE SULFATE 4 MG: 4 INJECTION INTRAVENOUS at 14:55

## 2020-01-01 RX ADMIN — HEPARIN SODIUM 5000 UNITS: 5000 INJECTION INTRAVENOUS; SUBCUTANEOUS at 01:20

## 2020-01-01 RX ADMIN — HYDROCODONE BITARTRATE AND ACETAMINOPHEN 1 TABLET: 10; 325 TABLET ORAL at 22:21

## 2020-01-01 RX ADMIN — SODIUM CHLORIDE 100 ML: 900 INJECTION, SOLUTION INTRAVENOUS at 10:18

## 2020-01-01 RX ADMIN — MORPHINE SULFATE 4 MG: 4 INJECTION INTRAVENOUS at 13:46

## 2020-01-01 RX ADMIN — HEPARIN SODIUM 12 UNITS/KG/HR: 5000 INJECTION, SOLUTION INTRAVENOUS at 01:19

## 2020-01-01 RX ADMIN — MORPHINE SULFATE 2 MG: 2 INJECTION, SOLUTION INTRAMUSCULAR; INTRAVENOUS at 12:27

## 2020-01-01 RX ADMIN — LEVALBUTEROL HYDROCHLORIDE 1.25 MG: 1.25 SOLUTION RESPIRATORY (INHALATION) at 09:11

## 2020-01-01 RX ADMIN — METOPROLOL TARTRATE 12.5 MG: 25 TABLET, FILM COATED ORAL at 08:52

## 2020-01-01 RX ADMIN — MORPHINE SULFATE 5 MG: 10 INJECTION, SOLUTION INTRAMUSCULAR; INTRAVENOUS at 12:15

## 2020-01-01 RX ADMIN — ASPIRIN 81 MG 81 MG: 81 TABLET ORAL at 08:52

## 2020-01-01 RX ADMIN — BUPROPION HYDROCHLORIDE 150 MG: 150 TABLET, EXTENDED RELEASE ORAL at 22:20

## 2020-01-01 RX ADMIN — FUROSEMIDE 40 MG: 40 TABLET ORAL at 09:30

## 2020-01-01 RX ADMIN — SODIUM CHLORIDE 75 ML/HR: 9 INJECTION, SOLUTION INTRAVENOUS at 12:34

## 2020-01-01 RX ADMIN — GABAPENTIN 300 MG: 300 CAPSULE ORAL at 17:58

## 2020-01-01 RX ADMIN — GABAPENTIN 300 MG: 300 CAPSULE ORAL at 22:21

## 2020-01-01 RX ADMIN — GLYCOPYRROLATE 0.2 MG: 0.2 INJECTION, SOLUTION INTRAMUSCULAR; INTRAVENOUS at 21:17

## 2020-01-01 RX ADMIN — SODIUM CHLORIDE, PRESERVATIVE FREE 3 ML: 5 INJECTION INTRAVENOUS at 21:16

## 2020-01-01 RX ADMIN — SODIUM CHLORIDE 75 ML/HR: 9 INJECTION, SOLUTION INTRAVENOUS at 17:00

## 2020-01-01 RX ADMIN — MORPHINE SULFATE 5 MG: 10 INJECTION, SOLUTION INTRAMUSCULAR; INTRAVENOUS at 10:30

## 2020-01-01 RX ADMIN — SODIUM CHLORIDE 1000 ML: 9 INJECTION, SOLUTION INTRAVENOUS at 22:28

## 2020-01-01 RX ADMIN — MORPHINE SULFATE 2 MG: 2 INJECTION, SOLUTION INTRAMUSCULAR; INTRAVENOUS at 17:20

## 2020-01-01 RX ADMIN — TRAMADOL HYDROCHLORIDE 50 MG: 50 TABLET, FILM COATED ORAL at 20:21

## 2020-01-01 RX ADMIN — MORPHINE SULFATE 4 MG: 4 INJECTION INTRAVENOUS at 09:00

## 2020-01-01 RX ADMIN — SODIUM CHLORIDE, PRESERVATIVE FREE 3 ML: 5 INJECTION INTRAVENOUS at 08:59

## 2020-01-01 RX ADMIN — METOPROLOL TARTRATE 12.5 MG: 25 TABLET, FILM COATED ORAL at 22:20

## 2020-01-01 RX ADMIN — LORAZEPAM 0.5 MG: 0.5 TABLET ORAL at 00:45

## 2020-01-01 RX ADMIN — FAMOTIDINE 20 MG: 10 INJECTION, SOLUTION INTRAVENOUS at 08:53

## 2020-01-01 RX ADMIN — MORPHINE SULFATE 2 MG: 2 INJECTION, SOLUTION INTRAMUSCULAR; INTRAVENOUS at 05:46

## 2020-01-01 RX ADMIN — BUPROPION HYDROCHLORIDE 150 MG: 150 TABLET, EXTENDED RELEASE ORAL at 09:30

## 2020-01-01 RX ADMIN — Medication 10 ML: at 10:18

## 2020-01-01 RX ADMIN — MORPHINE SULFATE 2 MG: 2 INJECTION, SOLUTION INTRAMUSCULAR; INTRAVENOUS at 18:30

## 2020-01-01 RX ADMIN — IOPAMIDOL 100 ML: 755 INJECTION, SOLUTION INTRAVENOUS at 10:17

## 2020-01-01 RX ADMIN — BUDESONIDE 500 MCG: 0.5 INHALANT RESPIRATORY (INHALATION) at 21:41

## 2020-01-01 RX ADMIN — SODIUM CHLORIDE, PRESERVATIVE FREE 3 ML: 5 INJECTION INTRAVENOUS at 05:47

## 2020-01-01 RX ADMIN — SODIUM CHLORIDE, PRESERVATIVE FREE 3 ML: 5 INJECTION INTRAVENOUS at 17:21

## 2020-06-16 NOTE — ED TRIAGE NOTES
Pt states that she tripped and fell onto her left wrist and left hip this afternoon. Pt masked during triage

## 2020-06-17 NOTE — ED PROVIDER NOTES
Here after a fall with some pain that has persisted after this to the left wrist and slight pain to the left hip and pelvis region. Wrist is the main area of ongoing complaint. She is been up and ambulatory with no significant hip pain since the fall. No significant head injury. Denies any neck pain. No rib or chest pain. Other than as mentioned above patient denies any present complaint. No cuts or abrasions. No distal motor or sensory deficits. .  She is right wrist dominant The history is provided by the patient and a friend. Fall The accident occurred 3 to 5 hours ago. The fall occurred while walking. She fell from a height of ground level. She landed on hard floor. The point of impact was the left wrist. The pain is present in the left wrist. The pain is moderate. She was ambulatory at the scene. There was no drug use involved in the accident. There was no alcohol use involved in the accident. Pertinent negatives include no fever, no numbness, no bowel incontinence, no extremity weakness, no loss of consciousness, no tingling and no laceration. The risk factors include being elderly. Past Medical History:  
Diagnosis Date  Gastrointestinal disorder  Hypertension  Psychiatric disorder Past Surgical History:  
Procedure Laterality Date  BREAST SURGERY PROCEDURE UNLISTED  HX APPENDECTOMY  HX CHOLECYSTECTOMY  HX HEENT History reviewed. No pertinent family history. Social History Socioeconomic History  Marital status:  Spouse name: Not on file  Number of children: Not on file  Years of education: Not on file  Highest education level: Not on file Occupational History  Not on file Social Needs  Financial resource strain: Not on file  Food insecurity Worry: Not on file Inability: Not on file  Transportation needs Medical: Not on file Non-medical: Not on file Tobacco Use  
  Smoking status: Never Smoker  Smokeless tobacco: Never Used Substance and Sexual Activity  Alcohol use: Not Currently  Drug use: Not Currently  Sexual activity: Not Currently Lifestyle  Physical activity Days per week: Not on file Minutes per session: Not on file  Stress: Not on file Relationships  Social connections Talks on phone: Not on file Gets together: Not on file Attends Tenriism service: Not on file Active member of club or organization: Not on file Attends meetings of clubs or organizations: Not on file Relationship status: Not on file  Intimate partner violence Fear of current or ex partner: Not on file Emotionally abused: Not on file Physically abused: Not on file Forced sexual activity: Not on file Other Topics Concern  Not on file Social History Narrative  Not on file ALLERGIES: Pcn [penicillins] and Sulfa (sulfonamide antibiotics) Review of Systems Constitutional: Negative for chills and fever. Respiratory: Negative. Gastrointestinal: Negative. Negative for bowel incontinence. Musculoskeletal: Positive for arthralgias and joint swelling. Negative for back pain, extremity weakness, neck pain and neck stiffness. Neurological: Negative for tingling, loss of consciousness and numbness. Psychiatric/Behavioral: Negative. All other systems reviewed and are negative. Vitals:  
 06/16/20 1951 BP: 117/66 Pulse: (!) 57 Resp: 16 Temp: 98.2 °F (36.8 °C) SpO2: 95% Weight: 81.6 kg (180 lb) Height: 5' 2\" (1.575 m) Physical Exam 
Vitals signs and nursing note reviewed. Constitutional:   
   Appearance: Normal appearance. HENT:  
   Head: Atraumatic. Right Ear: External ear normal.  
   Left Ear: External ear normal.  
   Nose: Nose normal.  
Neck: Musculoskeletal: Neck supple. No neck rigidity or muscular tenderness. Cardiovascular: Rate and Rhythm: Normal rate. Pulmonary:  
   Effort: Pulmonary effort is normal.  
Chest:  
   Chest wall: No tenderness. Abdominal:  
   Palpations: Abdomen is soft. Musculoskeletal:     
   General: Tenderness present. Right lower leg: No edema. Left lower leg: No edema. Comments: Bony review of upper and lower extremity shows only area of persistent abnormality associated with swelling approximately over the distal radius and radial side carpal bones. There is no palpable crepitance. No distal neurovascular abnormality. Forearm and elbow shoulder are normal.  Right upper extremity all normal.  No tenderness over the left hip and no evidence of fracture. Skin: 
   Coloration: Skin is not pale. Findings: No bruising, erythema or laceration. Neurological:  
   Mental Status: She is alert. Mental status is at baseline. Sensory: No sensory deficit. Motor: No weakness. Psychiatric:     
   Mood and Affect: Mood normal.     
   Behavior: Behavior normal.     
   Thought Content: Thought content normal.  
 
  
 
MDM Number of Diagnoses or Management Options Wrist sprain, left, initial encounter:  
Diagnosis management comments: Probable nondisplaced fracture to the left wrist.  We will place her in a splint. She may after. Of wearing this and swelling having occurred and reduced do better with a short arm cast that could be considered at follow-up. Will refer her to [address]. Amount and/or Complexity of Data Reviewed Tests in the radiology section of CPT®: reviewed and ordered Independent visualization of images, tracings, or specimens: yes Risk of Complications, Morbidity, and/or Mortality Presenting problems: moderate Diagnostic procedures: low Management options: moderate Patient Progress Patient progress: stable Procedures

## 2020-06-17 NOTE — ED NOTES
I have reviewed discharge instructions with the patient. The patient verbalized understanding. Patient left ED via Discharge Method: wheelchair to Home with  self). Opportunity for questions and clarification provided. Patient given 0 scripts. To continue your aftercare when you leave the hospital, you may receive an automated call from our care team to check in on how you are doing. This is a free service and part of our promise to provide the best care and service to meet your aftercare needs.  If you have questions, or wish to unsubscribe from this service please call [phone]. Thank you for Choosing our [facility] Emergency Department.

## 2020-06-17 NOTE — DISCHARGE INSTRUCTIONS
Elevation  Cool pack  Tylenol for pain  Contact orthopedics for follow-up (probable occult fracture that may require casting)

## 2020-07-06 PROBLEM — R07.9 CHEST PAIN: Status: ACTIVE | Noted: 2020-01-01

## 2020-07-06 NOTE — ED TRIAGE NOTES
Pt presents to ED via EMS with c/o CP for last 45 minutes and was talking to a friend when it started. Pt has had 3 caths with no stents. Took 2 nitros with relief and EMS gave 325mg aspirin 12 lead showed SB.  82/46-> after 500cc fluid BP went up to 108/63, 97.9 temp and BGL 96. Mask on pt from EMS. 20g to RFA. Pt states CP is at a 3/10 on pain scale now.

## 2020-07-06 NOTE — ED PROVIDER NOTES
Patient presents emerge department with a complaint of retrosternal chest pain that began approximately hour prior to arrival.  She called EMS as soon as the pain started and took some nitroglycerin which seemed to improve the pain. She has a history of coronary artery disease based on review of medical records however her last cardiac catheterization in 2013 showed mostly luminal irregularities with one 40% stenosis. She also has a known history of bradycardic rhythm. She currently rates the pain as 3/10 in intensity. The pain does not radiate and there are no associated symptoms with the pain. She did complain of some feeling of coolness in the left lower extremity. The history is provided by the patient. Chest Pain (Angina) This is a new problem. The current episode started 1 to 2 hours ago. The problem has been gradually improving. The problem occurs rarely. The pain is associated with rest. The pain is present in the substernal region. The pain is at a severity of 3/10. The pain is mild. Quality: Nondescript. The pain does not radiate. The symptoms are aggravated by palpation. Pertinent negatives include no abdominal pain, no back pain, no claudication, no cough, no diaphoresis, no dizziness, no exertional chest pressure, no fever, no headaches, no hemoptysis, no irregular heartbeat, no leg pain, no lower extremity edema, no malaise/fatigue, no nausea, no near-syncope, no numbness, no orthopnea, no palpitations, no PND, no shortness of breath, no sputum production, no vomiting and no weakness. She has tried nothing for the symptoms. The treatment provided no relief. Risk factors include cardiac disease and hypertension. Procedural history includes cardiac catheterization and echocardiogram.Pertinent negatives include no cardiac stents and no CABG. Past Medical History:  
Diagnosis Date  Gastrointestinal disorder  Hypertension  Psychiatric disorder Past Surgical History: Procedure Laterality Date  BREAST SURGERY PROCEDURE UNLISTED  HX APPENDECTOMY  HX CHOLECYSTECTOMY  HX HEENT History reviewed. No pertinent family history. Social History Socioeconomic History  Marital status:  Spouse name: Not on file  Number of children: Not on file  Years of education: Not on file  Highest education level: Not on file Occupational History  Not on file Social Needs  Financial resource strain: Not on file  Food insecurity Worry: Not on file Inability: Not on file  Transportation needs Medical: Not on file Non-medical: Not on file Tobacco Use  Smoking status: Never Smoker  Smokeless tobacco: Never Used Substance and Sexual Activity  Alcohol use: Not Currently  Drug use: Not Currently  Sexual activity: Not Currently Lifestyle  Physical activity Days per week: Not on file Minutes per session: Not on file  Stress: Not on file Relationships  Social connections Talks on phone: Not on file Gets together: Not on file Attends Restoration service: Not on file Active member of club or organization: Not on file Attends meetings of clubs or organizations: Not on file Relationship status: Not on file  Intimate partner violence Fear of current or ex partner: Not on file Emotionally abused: Not on file Physically abused: Not on file Forced sexual activity: Not on file Other Topics Concern  Not on file Social History Narrative  Not on file ALLERGIES: Pcn [penicillins] and Sulfa (sulfonamide antibiotics) Review of Systems Constitutional: Negative for diaphoresis, fever and malaise/fatigue. Respiratory: Negative for cough, hemoptysis, sputum production and shortness of breath. Cardiovascular: Positive for chest pain. Negative for palpitations, orthopnea, claudication, PND and near-syncope. Gastrointestinal: Negative for abdominal pain, nausea and vomiting. Musculoskeletal: Negative for back pain. Neurological: Negative for dizziness, weakness, numbness and headaches. All other systems reviewed and are negative. There were no vitals filed for this visit. Physical Exam 
Vitals signs and nursing note reviewed. Constitutional:   
   General: She is not in acute distress. Appearance: Normal appearance. She is normal weight. She is not ill-appearing, toxic-appearing or diaphoretic. HENT:  
   Head: Normocephalic and atraumatic. Eyes:  
   Extraocular Movements: Extraocular movements intact. Conjunctiva/sclera: Conjunctivae normal.  
   Pupils: Pupils are equal, round, and reactive to light. Neck: Musculoskeletal: Normal range of motion and neck supple. Comments: No JVD Cardiovascular:  
   Rate and Rhythm: Regular rhythm. Bradycardia present. Pulses: Normal pulses. Heart sounds: Normal heart sounds. Pulmonary:  
   Effort: Pulmonary effort is normal.  
   Breath sounds: Normal breath sounds. Abdominal:  
   Palpations: Abdomen is soft. Tenderness: There is no abdominal tenderness. There is no guarding or rebound. Musculoskeletal: Normal range of motion. Right lower leg: No edema. Left lower leg: No edema. Comments: Distal pulses were present in the left lower extremity. Skin temperature is symmetric. Capillary refills less than 2 seconds. No evidence of ischemia. Skin: 
   General: Skin is warm and dry. Capillary Refill: Capillary refill takes less than 2 seconds. Neurological:  
   General: No focal deficit present. Mental Status: She is alert and oriented to person, place, and time. Mental status is at baseline. Psychiatric:     
   Mood and Affect: Mood normal.     
   Behavior: Behavior normal.     
   Thought Content: Thought content normal.  
 
  
 
MDM Number of Diagnoses or Management Options Amount and/or Complexity of Data Reviewed Clinical lab tests: ordered and reviewed Tests in the radiology section of CPT®: ordered and reviewed Review and summarize past medical records: yes Independent visualization of images, tracings, or specimens: yes (EKG at 1617 shows a sinus rhythm with probable second-degree AV block type II. Rate of 47. No acute ischemic changes or ectopy. Repeat EKG at 1729: Unchanged from prior although heart rate is now 38 bpm.) Risk of Complications, Morbidity, and/or Mortality Presenting problems: moderate Diagnostic procedures: moderate Management options: moderate Patient Progress Patient progress: stable Procedures

## 2020-07-06 NOTE — CONSULTS
Pt seen and examined. A note is dictated. Imp: 
Precordial chest pain and tenderness, suspect musculoskeletal in origin Minimal increase in troponin -High sensitivity of uncertain clinical significance Chronic frequent falls Chronic bradycardia Peripheral neuropathy Recomm: Analgesia Stop metoprolol OK to observe overnight and trend troponin. Fu w/ her Debbie pcp at discharge

## 2020-07-07 PROBLEM — I71.00 AORTIC DISSECTION (HCC): Status: ACTIVE | Noted: 2020-01-01

## 2020-07-07 PROBLEM — I77.70: Status: ACTIVE | Noted: 2020-01-01

## 2020-07-07 NOTE — PROGRESS NOTES
Critical troponin of 1,328.9  Sent message in perfect to Dr Kaushik Mckeon. Pt's VSS,  No current c/o,  Just was up to [facility] and voided 400 cc clear yellow urine. Heparin drip infusing as ordered.

## 2020-07-07 NOTE — ED TRIAGE NOTES
Pt brought from [facility] [address] 3rd floor ortho for active aortic dissection. Pt was admitted to [facility] for chest pain and started on a heparin drip. After receiving a chest xray it was determined that pt had large aortic aneurysm. Heparin dripped stopped at [facility]. Pt transferred via [facility] ambulance [place] for ICU admission and possible surgery. VS for EMS bp 990/60, hr 68 SR.

## 2020-07-07 NOTE — ED NOTES
TRANSFER - OUT REPORT: 
 
Verbal report given to [facility] RN on Donell Hyatt  being transferred to [facility] for routine progression of care Report consisted of patients Situation, Background, Assessment and  
Recommendations(SBAR). Information from the following report(s) SBAR was reviewed with the receiving nurse. Lines:    
 
Opportunity for questions and clarification was provided. Patient transported with: 
 Registered Nurse

## 2020-07-07 NOTE — ACP (ADVANCE CARE PLANNING)
Patient has spoken with both Hospitalist and Cardiology team and has expressed she has a DNR status and wants to keep it. Please see notes of both physicians on 7/7/20.

## 2020-07-07 NOTE — PROGRESS NOTES
This is an 81 YO female patient who was admitted to the [address] on 7/7/20 due to chest pain. She was seen by cardiology. Due to elevated troponin level she was managed with IV heparin. Today a chest ct scan was done which reported a developing type I aortic dissection with aneurysmal dilatation. She was transferred to the [facility] to be evaluated by cardiothoracic surgery. The patient has expressed she does NOT want any surgical intervention due to the high risk involved. She has expressed she has a DNR status and wants to keep it. Case discussed with Dr Betty Coffman ( CT surgery) and Dr Shonna Mcguire ( cardiology). I have been informed by the house supervisor that due to lack of available rooms in the [facility] facility, the patient will need to go back to the [facility] side hospital. Heparin stopped. ASA stopped. Will keep her BP below 140/90. Continue lopressor BID. Norvasc held for now due to low BP. IV labetalol prn. IV fluids. Case discussed with Dr Patsy Quiñonez. If patient continues deteriorating palliative care vs hospice can be consulted tomorrow.

## 2020-07-07 NOTE — PROGRESS NOTES
[facility] CARDIOLOGY PROGRESS NOTE 
      
 
7/7/2020 12:52 PM 
 
Admit Date: 7/7/2020 Subjective:  
CT scan demonstrates type A aortic dissection. Transferred to ER, no ongoing pain at this time. BP low 90s on arrival, has improved to 400 mmHg systolic, HR in 34Y. ROS: 
Cardiovascular:  As noted above Objective:  
  
Vitals:  
 07/07/20 1215 07/07/20 1216 07/07/20 1231 07/07/20 1245 BP: 115/59 115/59 120/56 127/63 Pulse: 68 68 64 61 Resp: 18 17  17 Temp: 98.3 °F (36.8 °C) SpO2: 90% 90% 95% 95% Weight: 81.6 kg (180 lb) Height: 5' 7\" (1.702 m) Physical Exam: 
General-No Acute Distress, awake, alert Neck- supple, no JVD 
CV- regular rate and rhythm, 2/6 diastolic murmur left upper sternal border Lung- no wheezes bilaterally, non labored respirations Abd- soft, nontender, nondistended Skin- warm and dry Data Review:  
Recent Labs  
  07/07/20 
0414 07/06/20 
1621  140  
K 3.8 3.8 MG  --  2.2 BUN 14 14 CREA 0.97 1.01*  87 WBC 8.7 6.3 HGB 10.8* 10.6* HCT 35.1* 33.4*  
 186 INR  --  1.1 Assessment/Plan: Active Problems: 
  Chest pain (7/6/2020) Dissecting aneurysm of artery ([address] (7/7/2020) Aortic dissection ([address] (7/7/2020) - Type A per CT scan of aorta, not candidate for operative repair, seen by CT surgery today  
  - no large pericardial effusion on CT scan  
  - not in shock at this time 
  - stop heparin 
  - stop ASA - pain control as needed 
  - control HR And BP as needed, was low on arrival labetalol can be used if possible for HR/BP control 
  - high mortality condition, recommend palliative care evaluation given advanced age life threatening condition DNR per patient request.  
 
Chinyere Ortiz DO 
7/7/2020 12:52 PM

## 2020-07-07 NOTE — PROGRESS NOTES
Pt's person of contact Siva Maher has been informed of pt being transferred to [facility] and of pts condition

## 2020-07-07 NOTE — ED NOTES
TRANSFER - OUT REPORT: 
 
Verbal report given to Sylvia Govea on Maegan Dai  being transferred to [facility]) for routine progression of care Report consisted of patients Situation, Background, Assessment and  
Recommendations(SBAR). Information from the following report(s) SBAR was reviewed with the receiving nurse. Lines:  
Peripheral IV 07/06/20 Right Wrist (Active) Site Assessment Clean, dry, & intact 7/7/2020 10:09 AM  
Phlebitis Assessment 0 7/7/2020 10:09 AM  
Infiltration Assessment 0 7/7/2020 10:09 AM  
Dressing Status Clean, dry, & intact 7/7/2020 10:09 AM  
Dressing Type Transparent;Tape 7/7/2020 10:09 AM  
Hub Color/Line Status Infusing 7/7/2020 10:09 AM  
   
Peripheral IV 07/07/20 Left Antecubital (Active) Opportunity for questions and clarification was provided. Patient transported with: 
 woody 
monitored

## 2020-07-07 NOTE — ED NOTES
Pt arrives with even and unlabored respirations, denies any discomfort or shob at this time. Cardiologist and hospitalist at bedside.

## 2020-07-07 NOTE — DISCHARGE SUMMARY
Hospitalist Discharge Summary Admit Date:  2020  4:02 PM  
Name:  Lizy Interiano Age:  80 y.o. 
:  5/3/1932 MRN:  [id number] PCP:  Ravi Bernardo MD 
Treatment Team: Attending Provider: Peter Duron MD; Consulting Provider: Anastasiia Barros MD, Ludy Lewis, CONOR-DREW 
 
PROBLEMS:  
Chest pain History of cardiac cath x 3 with no stents. CAD with MI x 3 Chronic Bradycardia Hypotension with baseline hypertension GERD Left wrist fracture  due to fall Frequent falls:  11x in last year Asthma Neuropathy Remote history of breast cancer Hospital Course:  Patient presented to [facility]  with complaints of precordial chest pain x 45 minutes. Was sitting and talking with friend at onset. Took 2 nitros with relief. EMS gave 325 mg ASA en route. Blood pressure 82/46, 500 ml saline administered with rise to 108/63. Chest pain 3/10 on arrival. No additional symptoms, illness, or radicular pain. History of bradycardia, cardiac cath x 3 without stents, last in . Admission EKG with AV block type II, rate 38-47, inverted T waves which is apparently chronic. Repeat EKG unchanged. Admission troponin: 19.3. Began having chest pain 6/10 again at 1085, ntg given, down to 3/10. Dr Son Das in for consult, chest pain most likely musculoskeletal, stopped metoprolol. Troponin up to 548.1 at 2344. Remains in bradycardia. This am, troponin up to 1328.9 at 0414. Last cardiac cath in  with old area of apical anterior and apical akinesis but no obstructive CAD. [facility]s at [facility]. :  [facility]. Supervisor notified, RN and patient informed. Patient states she will call family. Instructed to be NPO. Has had sips of water only since midnight. Plans in place for transfer to Dr Rachel Pierre on tele when D Dimer resulted at 7.56. Stat CTA chest ordered. Wheezy, albuterol aerosol administered. Notified of current findings. Patient frightened but cooperative. 1106:  Call from Radiology. Patient found with Developing type I aortic dissection with aneurysmal dilation, pulmonary nodules concerning for malignancy. Calls made to Dr Deysi Call, CV surgery [facility], and Dr Marco Murray, house supervisor here. [address] [place] [address] ICU has no beds, will have to send to ER as a direct admit. Patient informed she has a problem with a blood vessel in her heart that may require surgery. She is also informed she will be going to ICU. Disposition:  To DT telemetry Activity: Activity as tolerated Diet: DIET NPO Code Status: Full Code Discharge meds at bottom of this note. Plan was discussed with patient. All questions answered. Patient was stable at time of discharge. Diagnostic Imaging/Tests:  
6/16:  XRAY LEFT HIP:  Negative for acute fracture. Osteoarthritis. 
  
6/16: LEFT WRIST XRAY:  Questionable subtle impacted fracture distal radius 6/16:  CXR:  Unremarkable two-view chest 
 
Labs: Results:  
   
BMP, Mg, Phos Recent Labs  
  07/07/20 
0414 07/06/20 
1621  140  
K 3.8 3.8 * 110* CO2 23 24 AGAP 6* 6*  
BUN 14 14 CREA 0.97 1.01* CA 8.5 8.6  87 MG  --  2.2 CBC Recent Labs  
  07/07/20 
0414 07/06/20 
1621 WBC 8.7 6.3  
RBC 3.75* 3.65* HGB 10.8* 10.6* HCT 35.1* 33.4*  
 186 GRANS 67 56 LYMPH 19 27 EOS 3 7 MONOS 10 10 BASOS 1 1 IG 0 0 ANEU 5.9 3.5 ABL 1.6 1.7 DARA 0.3 0.4 ABM 0.9 0.6 ABB 0.1 0.1 AIG 0.0 0.0 LFT Recent Labs  
  07/06/20 
1621 ALT 19 AP 71  
TP 6.9 ALB 3.3 GLOB 3.6* AGRAT 0.9* Coagulation Tests Lab Results Component Value Date/Time Prothrombin time 14.4 07/06/2020 04:21 PM  
 INR 1.1 07/06/2020 04:21 PM  
 aPTT 118.1 (H) 07/07/2020 07:28 AM  
 aPTT 33.0 07/06/2020 04:21 PM  
  
 
Allergies Allergen Reactions  Pcn [Penicillins] Rash  Sulfa (Sulfonamide Antibiotics) Rash All Labs from Last 24 Hrs: 
Recent Results (from the past 24 hour(s)) EKG, 12 LEAD, INITIAL Collection Time: 07/06/20  4:17 PM  
Result Value Ref Range Ventricular Rate 47 BPM  
 Atrial Rate 79 BPM  
 QRS Duration 94 ms Q-T Interval 478 ms QTC Calculation (Bezet) 423 ms Calculated P Axis 0 degrees Calculated R Axis 33 degrees Diagnosis    
  !! AGE AND GENDER SPECIFIC ECG ANALYSIS !! Sinus bradycardia ST & T wave abnormality, consider anterior ischemia Abnormal ECG No previous ECGs available Confirmed by [facility]  MD ()HARLEY ([id number]) on 7/7/2020 7:30:23 AM 
  
CBC WITH AUTOMATED DIFF Collection Time: 07/06/20  4:21 PM  
Result Value Ref Range WBC 6.3 4.3 - 11.1 K/uL  
 RBC 3.65 (L) 4.05 - 5.2 M/uL  
 HGB 10.6 (L) 11.7 - 15.4 g/dL HCT 33.4 (L) 35.8 - 46.3 % MCV 91.5 79.6 - 97.8 FL  
 MCH 29.0 26.1 - 32.9 PG  
 MCHC 31.7 31.4 - 35.0 g/dL  
 RDW 13.1 11.9 - 14.6 % PLATELET 135 221 - 748 K/uL MPV 9.6 9.4 - 12.3 FL ABSOLUTE NRBC 0.00 0.0 - 0.2 K/uL  
 DF AUTOMATED NEUTROPHILS 56 43 - 78 % LYMPHOCYTES 27 13 - 44 % MONOCYTES 10 4.0 - 12.0 % EOSINOPHILS 7 0.5 - 7.8 % BASOPHILS 1 0.0 - 2.0 % IMMATURE GRANULOCYTES 0 0.0 - 5.0 %  
 ABS. NEUTROPHILS 3.5 1.7 - 8.2 K/UL  
 ABS. LYMPHOCYTES 1.7 0.5 - 4.6 K/UL  
 ABS. MONOCYTES 0.6 0.1 - 1.3 K/UL  
 ABS. EOSINOPHILS 0.4 0.0 - 0.8 K/UL  
 ABS. BASOPHILS 0.1 0.0 - 0.2 K/UL  
 ABS. IMM. GRANS. 0.0 0.0 - 0.5 K/UL METABOLIC PANEL, COMPREHENSIVE Collection Time: 07/06/20  4:21 PM  
Result Value Ref Range Sodium 140 136 - 145 mmol/L Potassium 3.8 3.5 - 5.1 mmol/L Chloride 110 (H) 98 - 107 mmol/L  
 CO2 24 21 - 32 mmol/L Anion gap 6 (L) 7 - 16 mmol/L Glucose 87 65 - 100 mg/dL BUN 14 8 - 23 MG/DL Creatinine 1.01 (H) 0.6 - 1.0 MG/DL  
 GFR est AA >60 >60 ml/min/1.73m2 GFR est non-AA 55 (L) >60 ml/min/1.73m2 Calcium 8.6 8.3 - 10.4 MG/DL Bilirubin, total 0.4 0.2 - 1.1 MG/DL  
 ALT (SGPT) 19 12 - 65 U/L  
 AST (SGOT) 15 15 - 37 U/L Alk. phosphatase 71 50 - 136 U/L Protein, total 6.9 6.3 - 8.2 g/dL Albumin 3.3 3.2 - 4.6 g/dL Globulin 3.6 (H) 2.3 - 3.5 g/dL A-G Ratio 0.9 (L) 1.2 - 3.5 MAGNESIUM Collection Time: 07/06/20  4:21 PM  
Result Value Ref Range Magnesium 2.2 1.8 - 2.4 mg/dL TROPONIN-HIGH SENSITIVITY Collection Time: 07/06/20  4:21 PM  
Result Value Ref Range Troponin-High Sensitivity 15.5 (H) 0 - 14 pg/mL PTT Collection Time: 07/06/20  4:21 PM  
Result Value Ref Range aPTT 33.0 24.3 - 35.4 SEC PROTHROMBIN TIME + INR Collection Time: 07/06/20  4:21 PM  
Result Value Ref Range Prothrombin time 14.4 12.0 - 14.7 sec INR 1.1 EKG, 12 LEAD, SUBSEQUENT Collection Time: 07/06/20  5:29 PM  
Result Value Ref Range Ventricular Rate 38 BPM  
 Atrial Rate 258 BPM  
 QRS Duration 96 ms  
 Q-T Interval 610 ms QTC Calculation (Bezet) 484 ms Calculated R Axis 24 degrees Calculated T Axis 17 degrees Diagnosis    
  !! AGE AND GENDER SPECIFIC ECG ANALYSIS !! Marked sinus bradycardia T wave abnormality, consider anterior ischemia Prolonged QT Abnormal ECG When compared with ECG of 06-JUL-2020 16:17, 
Previous ECG has undetermined rhythm, needs review QT has lengthened Confirmed by Guera Duncan MD (), HARLEY MOLINA ([id number]) on 7/7/2020 7:33:44 AM 
  
TROPONIN-HIGH SENSITIVITY Collection Time: 07/06/20  5:59 PM  
Result Value Ref Range Troponin-High Sensitivity 19.3 (H) 0 - 14 pg/mL TROPONIN-HIGH SENSITIVITY Collection Time: 07/06/20 11:44 PM  
Result Value Ref Range Troponin-High Sensitivity 548.1 (HH) 0 - 14 pg/mL METABOLIC PANEL, BASIC Collection Time: 07/07/20  4:14 AM  
Result Value Ref Range Sodium 140 136 - 145 mmol/L Potassium 3.8 3.5 - 5.1 mmol/L  Chloride 111 (H) 98 - 107 mmol/L  
 CO2 23 21 - 32 mmol/L  
 Anion gap 6 (L) 7 - 16 mmol/L Glucose 100 65 - 100 mg/dL BUN 14 8 - 23 MG/DL Creatinine 0.97 0.6 - 1.0 MG/DL  
 GFR est AA >60 >60 ml/min/1.73m2 GFR est non-AA 58 (L) >60 ml/min/1.73m2 Calcium 8.5 8.3 - 10.4 MG/DL  
CBC WITH AUTOMATED DIFF Collection Time: 07/07/20  4:14 AM  
Result Value Ref Range WBC 8.7 4.3 - 11.1 K/uL  
 RBC 3.75 (L) 4.05 - 5.2 M/uL  
 HGB 10.8 (L) 11.7 - 15.4 g/dL HCT 35.1 (L) 35.8 - 46.3 % MCV 93.6 79.6 - 97.8 FL  
 MCH 28.8 26.1 - 32.9 PG  
 MCHC 30.8 (L) 31.4 - 35.0 g/dL  
 RDW 13.3 11.9 - 14.6 % PLATELET 037 747 - 027 K/uL MPV 9.9 9.4 - 12.3 FL ABSOLUTE NRBC 0.00 0.0 - 0.2 K/uL  
 DF AUTOMATED NEUTROPHILS 67 43 - 78 % LYMPHOCYTES 19 13 - 44 % MONOCYTES 10 4.0 - 12.0 % EOSINOPHILS 3 0.5 - 7.8 % BASOPHILS 1 0.0 - 2.0 % IMMATURE GRANULOCYTES 0 0.0 - 5.0 %  
 ABS. NEUTROPHILS 5.9 1.7 - 8.2 K/UL  
 ABS. LYMPHOCYTES 1.6 0.5 - 4.6 K/UL  
 ABS. MONOCYTES 0.9 0.1 - 1.3 K/UL  
 ABS. EOSINOPHILS 0.3 0.0 - 0.8 K/UL  
 ABS. BASOPHILS 0.1 0.0 - 0.2 K/UL  
 ABS. IMM. GRANS. 0.0 0.0 - 0.5 K/UL  
TROPONIN-HIGH SENSITIVITY Collection Time: 07/07/20  4:14 AM  
Result Value Ref Range Troponin-High Sensitivity 1,328.9 (HH) 0 - 14 pg/mL PTT Collection Time: 07/07/20  7:28 AM  
Result Value Ref Range aPTT 118.1 (H) 24.3 - 35.4 SEC Discharge Exam: 
Patient Vitals for the past 24 hrs: 
 Temp Pulse Resp BP SpO2  
07/07/20 0737 98.6 °F (37 °C) 60 18 139/67 94 % 07/07/20 0432 97.6 °F (36.4 °C) 60 18 119/69 90 % 07/06/20 2352 99.1 °F (37.3 °C) (!) 50 18 137/59 90 % 07/06/20 2211     93 % 07/06/20 2125 98 °F (36.7 °C) (!) 54 20 131/70 90 % 07/06/20 2059 98.1 °F (36.7 °C) (!) 45 20 150/64 93 % 07/06/20 2049  (!) 46 20 148/86 93 % 07/06/20 2039  (!) 44 20 156/68 93 % 07/06/20 2029  (!) 42 20 147/67 94 % 07/06/20 2019  (!) 53 20 139/71 (!) 82 % 07/06/20 2009  (!) 50 16 160/71 93 % 07/06/20 1959  (!) 40 16 159/70 93 % 07/06/20 1933  (!) 40 19  93 % 07/06/20 1929  (!) 36 16 154/65 93 % 07/06/20 1919  (!) 38 16 126/60 92 % 07/06/20 1909  (!) 40 16 110/51 93 % 07/06/20 1839  (!) 43 16 106/54 91 % 07/06/20 1829  (!) 41 16 106/58 93 % 07/06/20 1822  (!) 43 16  92 % 07/06/20 1817  (!) 44 17 94/51 93 % 07/06/20 1812  (!) 41 16 108/56 92 % 07/06/20 1808  (!) 37  142/65   
07/06/20 1803  (!) 40 16 142/65 95 % 07/06/20 1750  (!) 37 16  95 % 07/06/20 1745  (!) 37 17  94 % 07/06/20 1730  (!) 44 12 121/59   
07/06/20 1609 98 °F (36.7 °C) (!) 54 18 (!) 183/132 93 % Oxygen Therapy O2 Sat (%): 94 % (07/07/20 0737) Pulse via Oximetry: 43 beats per minute (07/06/20 2211) O2 Device: Room air (07/06/20 2211) Estimated body mass index is 32.9 kg/m² as calculated from the following: 
  Height as of this encounter: 5' 2\" (1.575 m). Weight as of this encounter: 81.6 kg (179 lb 14.3 oz). Intake/Output Summary (Last 24 hours) at 7/7/2020 9081 Last data filed at 7/7/2020 1323 Gross per 24 hour Intake  Output 550 ml Net -550 ml General:    Well nourished. Alert. Anxious Eyes:   Normal sclerae. Extraocular movements intact. ENT:  Normocephalic, atraumatic. Moist mucous membranes CV:   Regular rate and rhythm. No murmur, rub, or gallop. Lungs:  Scattered wheezing. Albuterol aero ordered prior to transfer, otherwise clear to auscultation Abdomen: Soft, nontender, nondistended. Extremities: Warm and dry. No cyanosis or edema. Neurologic: CN II-XII grossly intact. No gross focal deficits Skin:     No rashes or jaundice. Psych:  Normal mood and affect. Current Med List in Hospital:  
Current Facility-Administered Medications Medication Dose Route Frequency  heparin 25,000 units in dextrose 500 mL infusion  12-25 Units/kg/hr IntraVENous TITRATE  albuterol (PROVENTIL VENTOLIN) nebulizer solution 2.5 mg  2.5 mg Nebulization Q4H PRN  
 nitroglycerin (NITROSTAT) tablet 0.4 mg  0.4 mg SubLINGual Q5MIN PRN  
 amLODIPine (NORVASC) tablet 10 mg  10 mg Oral DAILY  aspirin tablet 325 mg  325 mg Oral DAILY  atorvastatin (LIPITOR) tablet 40 mg  40 mg Oral QHS  fluticasone (FLOVENT HFA) 110 mcg inhaler  1 Puff Inhalation BID RT  
 buPROPion SR (WELLBUTRIN SR) tablet 150 mg  150 mg Oral BID  loratadine (CLARITIN) tablet 10 mg  10 mg Oral DAILY  furosemide (LASIX) tablet 40 mg  40 mg Oral DAILY  gabapentin (NEURONTIN) capsule 300 mg  300 mg Oral BID  isosorbide mononitrate ER (IMDUR) tablet 15 mg  15 mg Oral DAILY  pantoprazole (PROTONIX) tablet 40 mg  40 mg Oral ACB  oxybutynin (DITROPAN) tablet 5 mg  5 mg Oral BID  sodium chloride (NS) flush 5-40 mL  5-40 mL IntraVENous Q8H  
 sodium chloride (NS) flush 5-40 mL  5-40 mL IntraVENous PRN  
 acetaminophen (TYLENOL) tablet 650 mg  650 mg Oral Q4H PRN  
 HYDROcodone-acetaminophen (NORCO)  mg tablet 1 Tab  1 Tab Oral Q4H PRN  
 naloxone (NARCAN) injection 0.4 mg  0.4 mg IntraVENous PRN  
 bisacodyL (DULCOLAX) tablet 5 mg  5 mg Oral DAILY PRN  
 LORazepam (ATIVAN) tablet 0.5 mg  0.5 mg Oral BID PRN Time spent in patient discharge planning and coordination 45 minutes.  
 
Signed: 
Agata Garcia NP

## 2020-07-07 NOTE — PROGRESS NOTES
Pt has been admitted into room 330 from the ER via stretcher. Pt alert and oriented x's 4,  VSS. Pt has a pink cast on her left forearm,  Pt states it is from a fall on June 16th, and she has a broken bone in her wrist.  Also stated she just got the cast changed to day at Dr's office. Pt also has a very small abrasion on her left knee and a brusie on the medial portion of her knee,  One smaller bruise on her left shoulder. Pt states she has fallen 11 times in the last year. Bed alarm on and explained to pt that if she t reynold to get up by herself the alarm will sound and let us know, so we will come to her room. Showed pt the call light button and told her to use it if she needs to get up. Pt c/o of her back aching. A box lunch provided as pt has not eaten since breakfast.    Oriented patient to the other bed controls,  Dining on call and her plan of care.

## 2020-07-07 NOTE — CONSULTS
I have seen this pt face to face for evaluation. My findings are as follows. very elderly, frail pt had chest pain yesterday. Admitted and CT chest reveals dissection which includes ascending aorta. No AI, no tamponade. Currently VS stable. 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 I think risk of death or major complication including stroke is greater than 50%. Agree with plan for Palliative Care Philip Butler. MD Fortino Barahona. MD Nusrat Holloway 7/7/2020        5/3/1932 REFERRING PHYSICIAN:  Vasquez Jaime NP  
 
HISTORY OF PRESENT ILLNESS: 
The patient is a 80 y.o. female who presented to the ER at [facility] with chest pain. She stated she was talking to her friend on the phone when she developed severe chest pain. EMS was summoned. On arrival to the ER, her troponin was elevated. She was admitted and started on IV Heparin. She was seen by cardiology. D-dimer was elevated as well. A CT was then ordered to evaluate for PE which showed a type 1 aortic dissection. Heparin was then stopped. She was transferred to [facility]. She is seen in the ER at [facility]. She is adamant that she does not want any high risk surgery. She states, \"I am still in my right mind. \" She states that she does not want anyone to try and \"override\" her decision. She lives at an independent living facility but she has her meals provided and has a friend that does her laundry. She uses a rollator and states she would like to have a motorized wheelchair. She currently denies any chest pain. She states she is sleepy since she did not sleep much last night. Past Medical History:  
Diagnosis Date  Gastrointestinal disorder  Hypertension  Psychiatric disorder Past Surgical History:  
Procedure Laterality Date  BREAST SURGERY PROCEDURE UNLISTED  HX APPENDECTOMY  HX CHOLECYSTECTOMY  HX HEENT History reviewed. No pertinent family history. Social History Socioeconomic History  Marital status:  Spouse name: Not on file  Number of children: Not on file  Years of education: Not on file  Highest education level: Not on file Occupational History  Not on file Social Needs  Financial resource strain: Not on file  Food insecurity Worry: Not on file Inability: Not on file  Transportation needs Medical: Not on file Non-medical: Not on file Tobacco Use  Smoking status: Never Smoker  Smokeless tobacco: Never Used Substance and Sexual Activity  Alcohol use: Not Currently  Drug use: Not Currently  Sexual activity: Not Currently Lifestyle  Physical activity Days per week: Not on file Minutes per session: Not on file  Stress: Not on file Relationships  Social connections Talks on phone: Not on file Gets together: Not on file Attends Jainism service: Not on file Active member of club or organization: Not on file Attends meetings of clubs or organizations: Not on file Relationship status: Not on file  Intimate partner violence Fear of current or ex partner: Not on file Emotionally abused: Not on file Physically abused: Not on file Forced sexual activity: Not on file Other Topics Concern  Not on file Social History Narrative  Not on file Allergies Allergen Reactions  Pcn [Penicillins] Rash  Sulfa (Sulfonamide Antibiotics) Rash Current Facility-Administered Medications on File Prior to Encounter Medication Dose Route Frequency Provider Last Rate Last Dose  [COMPLETED] heparin (porcine) injection 5,000 Units  5,000 Units IntraVENous ONCE Anna Navarro MD   5,000 Units at 07/07/20 0120  [COMPLETED] sodium chloride 0.9 % bolus infusion 100 mL  100 mL IntraVENous ONCE Anna Navarro MD   100 mL at 07/07/20 1018  [COMPLETED] saline peripheral flush soln 10 mL  10 mL InterCATHeter RAD ONCE Kristian Navarro MD   10 mL at 20 1018  [COMPLETED] iopamidoL (ISOVUE-370) 76 % injection 100 mL  100 mL IntraVENous ONCE Kristian Navarro MD   100 mL at 20 1017  [DISCONTINUED] heparin 25,000 units in dextrose 500 mL infusion  12-25 Units/kg/hr IntraVENous TITRATE Jasmyn Lewis NP   Stopped at 20 1103  [COMPLETED] albuterol (PROVENTIL VENTOLIN) nebulizer solution 2.5 mg  2.5 mg Nebulization NOW Rossy Lewis NP   2.5 mg at 20 4625  [DISCONTINUED] albuterol (PROVENTIL VENTOLIN) nebulizer solution 2.5 mg  2.5 mg Nebulization Q4H PRN Jasmyn Lewis NP      
 [] 0.9% sodium chloride infusion 1,000 mL  1,000 mL IntraVENous CONTINUOUS Leila Pop  mL/hr at 20 2228 1,000 mL at 20 2228  [DISCONTINUED] nitroglycerin (NITROSTAT) tablet 0.4 mg  0.4 mg SubLINGual Q5MIN PRN Jasmyn Lewis NP   0.4 mg at 20 1808  [DISCONTINUED] heparin (porcine) injection 6,550 Units  80 Units/kg IntraVENous NOW Ceola [place], DO   Stopped at 20 1900  [DISCONTINUED] heparin 25,000 units in dextrose 500 mL infusion  12-25 Units/kg/hr IntraVENous TITRATE Ceola [place], DO   Stopped at 20 1842  [DISCONTINUED] traMADoL (ULTRAM) tablet 50 mg  50 mg Oral Q6H PRN Genna Grady MD   50 mg at 20  [DISCONTINUED] amLODIPine (NORVASC) tablet 10 mg  10 mg Oral DAILY Rossy Lewis NP   10 mg at 20 0930  [DISCONTINUED] aspirin tablet 325 mg  325 mg Oral DAILY Rossy Lewis NP   325 mg at 20 0930  [DISCONTINUED] atorvastatin (LIPITOR) tablet 40 mg  40 mg Oral QHS Rossy Lewis NP   40 mg at 20 2221  [DISCONTINUED] fluticasone (FLOVENT HFA) 110 mcg inhaler  1 Puff Inhalation BID RT Angela Lara NP   1 Puff at 20 0808  [DISCONTINUED] buPROPion SR (WELLBUTRIN SR) tablet 150 mg  150 mg Oral BID Rossy Lewis NP   150 mg at 07/07/20 0930  [DISCONTINUED] loratadine (CLARITIN) tablet 10 mg  10 mg Oral DAILY Rossy Lewis NP   10 mg at 07/07/20 0930  [DISCONTINUED] furosemide (LASIX) tablet 40 mg  40 mg Oral DAILY HeatRossy arcos NP   40 mg at 07/07/20 0930  [DISCONTINUED] gabapentin (NEURONTIN) capsule 300 mg  300 mg Oral BID HeatRossy arcos NP   300 mg at 07/07/20 0930  [DISCONTINUED] isosorbide mononitrate ER (IMDUR) tablet 15 mg  15 mg Oral DAILY Rossy Lewis NP   15 mg at 07/07/20 0930  [DISCONTINUED] metoprolol tartrate (LOPRESSOR) tablet 12.5 mg  12.5 mg Oral BID Rachelle Dickey MD   12.5 mg at 07/06/20 2220  [DISCONTINUED] pantoprazole (PROTONIX) tablet 40 mg  40 mg Oral ACB Rossy Lewis NP   40 mg at 07/07/20 2551  
 [DISCONTINUED] oxybutynin (DITROPAN) tablet 5 mg  5 mg Oral BID [facility]   5 mg at 07/07/20 0930  [DISCONTINUED] sodium chloride (NS) flush 5-40 mL  5-40 mL IntraVENous Q8H [facility] [place], RONAK      
 [DISCONTINUED] sodium chloride (NS) flush 5-40 mL  5-40 mL IntraVENous PRN [facility] [place]      
 [DISCONTINUED] acetaminophen (TYLENOL) tablet 650 mg  650 mg Oral Q4H PRN [place], [place], RONAK      
 [DISCONTINUED] HYDROcodone-acetaminophen (NORCO)  mg tablet 1 Tab  1 Tab Oral Q4H PRN Joshua [place], NP   1 Tab at 07/06/20 2221  [DISCONTINUED] naloxone (NARCAN) injection 0.4 mg  0.4 mg IntraVENous PRN [facility] [place]      
 [DISCONTINUED] bisacodyL (DULCOLAX) tablet 5 mg  5 mg Oral DAILY PRN [facility] [place], NP      
[facility] [DISCONTINUED] LORazepam (ATIVAN) tablet 0.5 mg  0.5 mg Oral BID PRN Piotr Lewis NP   0.5 mg at 07/07/20 0045  [DISCONTINUED] enoxaparin (LOVENOX) injection 30 mg  30 mg SubCUTAneous Q24H Oneal Navarro MD      
 
 Current Outpatient Medications on File Prior to Encounter Medication Sig Dispense Refill  aspirin (aspirin) 325 mg tablet aspirin 325 mg tablet Take 1 tablet every day by oral route.  acetaminophen (Tylenol Extra Strength) 500 mg tablet Tylenol Extra Strength 500 mg tablet Take 1 tablet every 8 hours by oral route for 30 days.  amLODIPine (NORVASC) 10 mg tablet amlodipine 10 mg tablet Take 1 tablet every day by oral route.  atorvastatin (LIPITOR) 40 mg tablet atorvastatin 40 mg tablet Take 1 tablet every day by oral route.  budesonide (PULMICORT) 180 mcg/actuation aepb inhaler Pulmicort Flexhaler 180 mcg/actuation breath activated Inhale 1 puff twice a day by inhalation route.  butalbital-acetaminophen-caffeine (FIORICET, ESGIC) -40 mg per tablet Take 1 Tab by mouth every four (4) hours as needed.  buPROPion XL (Wellbutrin XL) 150 mg tablet Wellbutrin  mg 24 hr tablet, extended release Take 1 tablet every day by oral route.  cetirizine (ZYRTEC) 10 mg tablet cetirizine 10 mg tablet Take 1 tablet every day by oral route.  cholecalciferol (VITAMIN D3) (2,000 UNITS /50 MCG) cap capsule Take 1 Cap by mouth.  folic acid-vit M7-QRA A56 (FOLTX) 2.5-25-2 mg tablet Take 1 Tab by mouth.  furosemide (Lasix) 40 mg tablet Lasix 40 mg tablet Take 1 tablet every day by oral route.  ferrous sulfate 325 mg (65 mg iron) tablet Take 1 Tab by mouth.  gabapentin (NEURONTIN) 300 mg capsule gabapentin 300 mg capsule Take 1 capsule twice a day by oral route.  metoprolol tartrate (LOPRESSOR) 25 mg tablet metoprolol tartrate 25 mg tablet Take 0.5 tablets twice a day by oral route for 30 days.  isosorbide mononitrate ER (IMDUR) 30 mg tablet Take 15 mg by mouth.  omeprazole (PRILOSEC) 40 mg capsule omeprazole 40 mg capsule,delayed release Take 1 capsule twice a day by oral route.  oxybutynin (DITROPAN) 5 mg tablet Take 5 mg by mouth.  pantoprazole (PROTONIX) 40 mg tablet TAKE ONE TABLET EVERY DAY --Heath Jason  potassium chloride SR (K-TAB) 20 mEq tablet potassium chloride ER 20 mEq tablet,extended release Take 1 tablet every day by oral route.  sucralfate (CARAFATE) 1 gram tablet TAKE ONE TABLET TWICE A DAY ONE HOUR BEFORE MEALS WITH A GLASS OF WATER ---Lylia Picking REVIEW OF SYSTEMS: 
Review of Systems Constitution: Positive for malaise/fatigue. Negative for chills, fever, weight gain and weight loss. HENT: Negative for ear pain, hearing loss, nosebleeds, sore throat and tinnitus. Eyes: Negative for blurred vision, vision loss in left eye and vision loss in right eye. Cardiovascular: Negative for chest pain, dyspnea on exertion, leg swelling, near-syncope, orthopnea, palpitations, paroxysmal nocturnal dyspnea and syncope. Respiratory: Negative for cough, hemoptysis, shortness of breath, sputum production and wheezing. Endocrine: Negative for cold intolerance, heat intolerance and polydipsia. Hematologic/Lymphatic: Does not bruise/bleed easily. Skin: Negative for color change and rash. Musculoskeletal: Positive for joint pain. Negative for back pain, joint swelling and myalgias. Gastrointestinal: Negative for abdominal pain, constipation, diarrhea, dysphagia, heartburn, hematemesis, melena, nausea and vomiting. Genitourinary: Negative for dysuria, frequency, hematuria and urgency. Neurological: Negative for difficulty with concentration, dizziness, headaches, light-headedness, numbness, paresthesias, seizures, vertigo and weakness. Psychiatric/Behavioral: Negative for altered mental status and depression. Physical Exam 
Vitals:  
 07/07/20 1215 BP: 115/59 Pulse: 68 Resp: 18 Temp: 98.3 °F (36.8 °C) SpO2: 90% Weight: 180 lb (81.6 kg) Height: 5' 7\" (1.702 m) Physical Exam: General: Well Developed, Well Nourished, No Acute Distress HEENT: Normocephalic, pupils equal and round, no scleral icterus Neck: supple, no JVD Chest wall: No deformity Heart: S1S2 with RRR without murmurs or gallops Lungs: Clear throughout auscultation bilaterally Vascular: Pulses are full and equal. There is no venous stasis disease. Abd: soft, nontender, nondistended, with good bowel sounds, no pulsatile masses Ext: warm, no edema, calves supple/nontender, pulses 2+ bilaterally Skin: warm and dry, no rashes, cyanosis, jaundice, ecchymoses or evidence of skin breakdown Psychiatric: Normal mood and affect Neurologic: Alert and oriented X 3, no focal deficit noted Labs:  
Recent Labs  
  07/07/20 
0414 07/06/20 
1621  140  
K 3.8 3.8 MG  --  2.2 BUN 14 14 CREA 0.97 1.01*  87 WBC 8.7 6.3 HGB 10.8* 10.6* HCT 35.1* 33.4*  
 186 INR  --  1.1 Assessment:  
 
Active Problems: 
  Chest pain (7/6/2020) Dissecting aneurysm of artery ([address] (7/7/2020) Aortic dissection ([facility] [address] (7/7/2020) Plan:  
 
CT showed type A aortic dissection. She is adamant that she does not want any high risk surgery. She is currently comfortable and denies any chest pain. She will be admitted for conservative management.   
 
 
Jaxon Leon PA-C

## 2020-07-07 NOTE — PROGRESS NOTES
Dr Gage Childers called to have me call Dr Emeli Mondragon. Answering service said Buddy Huffman  NP was on call. She call in just a couple minutes and informed her of the pt and her condition including the troponin level.  Of 1,328.9, and she says that  Later today when they have beds      [facility],  She can be transferred,

## 2020-07-07 NOTE — PROGRESS NOTES
Shift assessment complete, see flow sheet for full assessment. Pt alert and oriented x4. denies pain at this time. Respirations even, expiratory wheezing. Continuous heparin drip @ 19.6 ml/hr. Tele Monitor S1 S2 auscultated, VSS. Skin warm, dry. Abdomen soft and non tender, bowel sounds active in all quadrants. Bed low and locked, call light within reach, pt advised to call if assistance is needed.

## 2020-07-07 NOTE — PROGRESS NOTES
TRANSFER - IN REPORT: 
 
Verbal report received from Major Daly RN on Daphne Jasmine  being received from ER for routine progression of care Report consisted of patients Situation, Background, Assessment and  
Recommendations(SBAR). Information from the following report(s) ED Summary, MAR and Recent Results was reviewed with the receiving nurse. Opportunity for questions and clarification was provided. Assessment completed upon patients arrival to unit and care assumed.

## 2020-07-07 NOTE — CONSULTS
[facility] CONSULTATION Name:  Edwin Miguel 
MR#:  [id number] :  1932 ACCOUNT #:  [de-identified] DATE OF SERVICE:  2020 CARDIOLOGY CONSULTATION 
 
HISTORY:  This is an 51-year-old lady who we are asked to see for chest pain. She was in her usual state of health until today when while talking on the telephone, she had the onset of a well localized left lower parasternal chest pain. She described it as a severe pain that made her scream.  She was evaluated by her  and EMS was called and she was brought to the [place] ER. Her EKG showed sinus bradycardia with T-wave inversions. Her bradycardia is chronic, and the T-wave inversions are also apparently chronic. She had two  troponins, the first one was 15 and the second one was 19, both of which being high-sensitivity troponins. She relates a prior history of three heart attacks but has never had a stent. In Care Everywhere, a cardiac catheterization from  reveals an old area of distal anterior and apical akinesis but no obstructive coronary artery disease, and it was unchanged from a prior cath of . She is followed locally on an intermittent basis in the [address] [place] [address]. She had an ultrasound of her heart three years ago, which showed normal left ventricular function, mild left ventricular hypertrophy. She falls frequently. She has fallen three times within the last month, one fall resulted in a cast on her left arm. She has not had any shortness of breath or jaw pain or arm pain. In February, she had right lower extremity edema but had a negative ultrasound. PAST MEDICAL HISTORY:  Her other medical problems include peripheral neuropathy and a remote history of breast cancer. She has had prior appendectomy, cholecystectomy, and knee replacement. MEDICATIONS:  Her medications at home are listed as aspirin, Tylenol.  amlodipine, Lipitor, Pulmicort, Fioricet, Wellbutrin, Zyrtec, cholecalciferol, folic acid, Lasix, iron, Neurontin, metoprolol, isosorbide, Prilosec, Ditropan, Protonix, potassium, and Carafate. FAMILY HISTORY:  Noncontributory. SOCIAL HISTORY:  Reveals her to be a nonsmoker. ALLERGIES:  SULFA AND PENICILLIN. REVIEW OF SYSTEMS:  Remarkable for headaches. She has not had any fever, chills, diplopia, tinnitus, epistaxis, dysphagia, abdominal pain or melena. PHYSICAL EXAMINATION: 
VITAL SIGNS:  Her pulse is 50, blood pressure 160/71. GENERAL:  This is a well-developed, well-nourished elderly lady in no distress. HEENT:  Grossly negative. There is no jaundice or drainage. NECK:  Her neck veins are flat. Carotids are negative. Thyroid is not enlarged. LUNGS:  Clear. HEART:  Reveals a regular rate and rhythm. There is no murmur or gallop. There is tenderness in the left precordium that mimics her pain. ABDOMEN:  Soft. There are no masses. There is no edema. NEUROLOGIC:  Grossly negative. MUSCULOSKELETAL:  Grossly negative. GENITAL AND RECTAL:  Not performed. LABORATORY DATA:  Her CBC shows mild anemia, hemoglobin 10.6, normochromic, normocytic. Her labs are negative except with minimal elevation of troponin. Her chest x-ray is unremarkable. IMPRESSION: 
1. Precordial chest pain and tenderness, which I suspect is musculoskeletal in origin. 2.  Minimal increase in troponin, high sensitivity, of uncertain clinical significance. 3.  Chronic frequent falls. 4.  Chronic bradycardia. 5.  Peripheral neuropathy. RECOMMENDATIONS:  Analgesia. Stop metoprolol. Okay to observe overnight. She will follow up with her [facility] physicians after discharge. Nancy Murphy MD 
 
 
[id number] 
D:  07/06/2020 20:32 
T:  07/07/2020 0:40 JOB #:  [id number]

## 2020-07-08 PROBLEM — I71.00 DISSECTING AORTIC ANEURYSM (HCC): Status: ACTIVE | Noted: 2020-01-01

## 2020-07-08 PROBLEM — I25.2 HISTORY OF MYOCARDIAL INFARCTION: Status: ACTIVE | Noted: 2020-01-01

## 2020-07-08 PROBLEM — N28.9 RENAL INSUFFICIENCY: Status: ACTIVE | Noted: 2020-01-01

## 2020-07-08 PROBLEM — E78.5 DYSLIPIDEMIA: Status: ACTIVE | Noted: 2020-01-01

## 2020-07-08 PROBLEM — I25.10 ATHEROSCLEROTIC HEART DISEASE: Status: ACTIVE | Noted: 2020-01-01

## 2020-07-08 PROBLEM — I10 ESSENTIAL HYPERTENSION: Status: ACTIVE | Noted: 2020-01-01

## 2020-07-08 PROBLEM — K21.9 GERD (GASTROESOPHAGEAL REFLUX DISEASE): Status: ACTIVE | Noted: 2020-01-01

## 2020-07-08 PROBLEM — J44.9 CHRONIC OBSTRUCTIVE LUNG DISEASE (HCC): Status: ACTIVE | Noted: 2018-02-07

## 2020-07-08 PROBLEM — F32.A DEPRESSIVE DISORDER: Status: ACTIVE | Noted: 2017-08-24

## 2020-07-08 PROBLEM — Z85.3 HISTORY OF BREAST CANCER: Status: ACTIVE | Noted: 2020-01-01

## 2020-07-08 NOTE — PROGRESS NOTES
Shift assessment complete, see flow sheet for full assessment. Pt alert and oriented x4, denies pain at this time. Respirations even, expiratory wheezing. S1 S2 auscultated, VSS. Skin warm, dry  Abdomen soft and non tender, bowel sounds active in all quadrants. Bed low and locked, call light within reach, pt advised to call if assistance is needed.

## 2020-07-08 NOTE — [facility] IDG NURSE NOTES
Patient: Dylon Mkie    Date: 07/08/20  Time: 1:47 PM    [facility] Nurse Notes  79 yo female arrived via [facility]. Dissecting aortic aneurysm. Pt is a DNR. Symptom management to include pain control, SOB, anxiety, agitation. Pt very SOB and appears in pain (FLACC 4/10) but states numeric level 0/10. Difficult time talking. Not alert but arousable to speech. Pt did not tolerate rolling very well at all and experienced a color change in face to dark red when rolling. Grimaced and expressed pain. O2 via [place] at 2 L continuous. Pt is bed bound. Pt has had 11 falls this year and most recently ended up with fx left ribs and fx left arm. Pure wik in place. Attached to suction. Pt refuses a martínez catheter at this time. Per report, Pt is a Baptism. Elan Roldan is local and first contact and was present at bedside on admission. Son, Laura Harman is present but is not local and should be called #2. Phone numbers verified. Admission complete and initial general hospice care plan initiated which includes spiritual, psychosocial, and bereavement. Immediate needs recognized for symptom management, family comfort, assessment of family and friend and patient EOL education. IDG team made aware of plan of care and immediate needs. Volunteer program suspended d/t COVID. Comfort bag provided to Pt by staff and companionship.      Signed by: Valentin Rao RN     Report given to [place] al[place], RN

## 2020-07-08 NOTE — HOSPICE
[facility]- 
 
I spoke with pt's POA/friend, Sheldon Mcginnis, over the phone and we discussed hospice services and he would like the [facility] for end-of-life comfort. I spoke the pt and her son who is at the bedside and they are also in agreement with the [facility]. Dr. Bucky Lorenzo has approved pt for general Inpatient level of care at the [facility]. Transport set up for ASAP- 1230 and [facility] made aware. Thank you for this referral- 
 
Jonathan Nunes RN BSN Hospice Liaison [phone]

## 2020-07-08 NOTE — DISCHARGE SUMMARY
Hospitalist Discharge Summary Admit Date:  2020  4:02 PM  
Name:  Neftaly Hardy Age:  80 y.o. 
:  5/3/1932 MRN:  [id number] PCP:  Pippa Rice MD 
Treatment Team: Attending Provider: Neto Redd DO; Consulting Provider: Yelitza Mendes MD, Vickyt Scott FNP-C  
 
PROBLEMS:   
Chest pain History of cardiac cath x 3 with no stents. CAD with MI x 3 Chronic Bradycardia Hypotension with baseline hypertension GERD Left wrist fracture  due to fall Frequent falls:  11x in last year Asthma Neuropathy Remote history of breast cancer Hospital Course:  Hospital Course:  Patient presented to [facility]  with complaints of precordial chest pain x 45 minutes. Was sitting and talking with friend at onset. Took 2 nitros with relief. EMS gave 325 mg ASA en route. Blood pressure 82/46, 500 ml saline administered with rise to 108/63. Chest pain 3/10 on arrival. No additional symptoms, illness, or radicular pain. History of bradycardia, cardiac cath x 3 without stents, last in . Admission EKG with AV block type II, rate 38-47, inverted T waves which is apparently chronic. Repeat EKG unchanged. Admission troponin: 19.3. Began having chest pain 6/10 again at 1085, ntg given, down to 3/10. Dr Clarissa Avina in for consult, chest pain most likely musculoskeletal, stopped metoprolol. Troponin up to 548.1 at 2344. Remains in bradycardia. This am, troponin up to 1328.9 at 0414. Last cardiac cath in  with old area of apical anterior and apical akinesis but no obstructive CAD. Follows at [facility]. :  Contacted by Cardiology with request for transfer to telemetry [facility] for Echocardiogram and possible cardiac cath. Supervisor notified, RN and patient informed. Patient states she will call family. Instructed to be NPO. Has had sips of water only since midnight. Plans in place for transfer to Dr Harinder Martinez on tele when D Dimer resulted at 7.56. Stat CTA chest ordered. Wheezy, albuterol aerosol administered. Notified of current findings. Patient frightened but cooperative. 1106:  Call from Radiology. Patient found with Developing type I aortic dissection with aneurysmal dilation, pulmonary nodules concerning for malignancy. Calls made to Dr Paty Ernst, CV surgery Jeanna Mckinney, and Dr Quinn Guevara, house supervisor here. [facility] ICU has no beds, will have to send to ER as a direct admit. Patient informed she has a problem with a blood vessel in her heart that may require surgery. She is also informed she will be going to ICU. On her arrival to , she was seen by Dr Paty Ernst, Dr Quinn Guevara, Cardiology, Dr Nico Sosa, [facility] surgery. She decided she does not want to have life saving surgery, and wishes to be DNR status. She is then transferred back to room 330 at [facility]. 7/8: In to speak with patient early, would like to pursue Hospice House as she will not be able to take care of herself at home. Her only request is to see her dog. Son, who is caring for her dog is notified to bring dog ASAP. Increased wheezing this am with SOB, having left mid scapular line over nipple pain with any movement radiating around to mid axillary line. Morphine 5 mg ordered, patient has been taking every 3 hours with pain relief. Son arrived with dog, patient extremely happy. Hospice rep here, will take to Hospice house immediately. They have also agreed son can bring dog to hospice house. Arrangements in place for transfer. Disposition: [facility] Activity: Activity as tolerated Diet: DIET REGULAR Code Status: DNR Discharge meds at bottom of this note. Plan was discussed with patient, son, RN. All questions answered. Patient was stable at time of discharge.    
 
Diagnostic Imaging/Tests:  
  
7/6:  CXR:  Unremarkable two-view chest 
 
7/7:  CT CHEST:  Developing type I aortic dissection with aneurysmal dilatation. Pulmonary nodules concerning for malignancy. No evidence of pulmonary embolic disease. Labs: Results:  
   
BMP, Mg, Phos Recent Labs  
  07/08/20 
0721 07/07/20 
0414 07/06/20 
1621  140 140  
K 3.2* 3.8 3.8  111* 110* CO2 28 23 24 AGAP 4* 6* 6*  
BUN 12 14 14 CREA 0.80 0.97 1.01* CA 8.8 8.5 8.6 * 100 87 MG 2.0  --  2.2 CBC Recent Labs  
  07/08/20 
0721 07/07/20 
0414 07/06/20 
1621 WBC 8.0 8.7 6.3  
RBC 3.54* 3.75* 3.65* HGB 10.2* 10.8* 10.6* HCT 32.4* 35.1* 33.4*  
 164 186 GRANS  --  67 56 LYMPH  --  19 27 EOS  --  3 7 MONOS  --  10 10 BASOS  --  1 1 IG  --  0 0 ANEU  --  5.9 3.5 ABL  --  1.6 1.7 DARA  --  0.3 0.4 ABM  --  0.9 0.6 ABB  --  0.1 0.1 AIG  --  0.0 0.0 LFT Recent Labs  
  07/08/20 
0721 07/06/20 
1621 ALT 23 19 AP 65 71  
TP 6.5 6.9 ALB 2.8* 3.3 GLOB 3.7* 3.6* AGRAT 0.8* 0.9* Coagulation Tests Lab Results Component Value Date/Time Prothrombin time 14.4 07/06/2020 04:21 PM  
 INR 1.1 07/06/2020 04:21 PM  
 aPTT 118.1 (H) 07/07/2020 07:28 AM  
 aPTT 33.0 07/06/2020 04:21 PM  
  
 
Allergies Allergen Reactions  Pcn [Penicillins] Rash  Sulfa (Sulfonamide Antibiotics) Rash All Labs from Last 24 Hrs: 
Recent Results (from the past 24 hour(s)) MAGNESIUM Collection Time: 07/08/20  7:21 AM  
Result Value Ref Range Magnesium 2.0 1.8 - 2.4 mg/dL CBC W/O DIFF Collection Time: 07/08/20  7:21 AM  
Result Value Ref Range WBC 8.0 4.3 - 11.1 K/uL  
 RBC 3.54 (L) 4.05 - 5.2 M/uL  
 HGB 10.2 (L) 11.7 - 15.4 g/dL HCT 32.4 (L) 35.8 - 46.3 % MCV 91.5 79.6 - 97.8 FL  
 MCH 28.8 26.1 - 32.9 PG  
 MCHC 31.5 31.4 - 35.0 g/dL  
 RDW 13.0 11.9 - 14.6 % PLATELET 978 033 - 653 K/uL MPV 9.9 9.4 - 12.3 FL ABSOLUTE NRBC 0.00 0.0 - 0.2 K/uL METABOLIC PANEL, COMPREHENSIVE Collection Time: 07/08/20  7:21 AM  
Result Value Ref Range  Sodium 139 136 - 145 mmol/L  
 Potassium 3.2 (L) 3.5 - 5.1 mmol/L Chloride 107 98 - 107 mmol/L  
 CO2 28 21 - 32 mmol/L Anion gap 4 (L) 7 - 16 mmol/L Glucose 111 (H) 65 - 100 mg/dL BUN 12 8 - 23 MG/DL Creatinine 0.80 0.6 - 1.0 MG/DL  
 GFR est AA >60 >60 ml/min/1.73m2 GFR est non-AA >60 >60 ml/min/1.73m2 Calcium 8.8 8.3 - 10.4 MG/DL Bilirubin, total 0.9 0.2 - 1.1 MG/DL  
 ALT (SGPT) 23 12 - 65 U/L  
 AST (SGOT) 26 15 - 37 U/L Alk. phosphatase 65 50 - 130 U/L Protein, total 6.5 6.3 - 8.2 g/dL Albumin 2.8 (L) 3.2 - 4.6 g/dL Globulin 3.7 (H) 2.3 - 3.5 g/dL A-G Ratio 0.8 (L) 1.2 - 3.5 Discharge Exam: 
Patient Vitals for the past 24 hrs: 
 Temp Pulse Resp BP SpO2  
07/08/20 0912     93 % 07/08/20 0851    151/61   
07/08/20 0742     99 % 07/08/20 0730 98.4 °F (36.9 °C) 68 16 139/61 97 % 07/08/20 0351 98.1 °F (36.7 °C) 70 16 119/68 95 % 07/08/20 0053 98.3 °F (36.8 °C) 66 16 127/70 95 % 07/07/20 2148     96 % 07/07/20 2015 98.9 °F (37.2 °C) 98 16 118/76 96 % 07/07/20 1615 97.2 °F (36.2 °C) 76 16 93/56 96 % Oxygen Therapy O2 Sat (%): 93 % (07/08/20 0912) Pulse via Oximetry: 83 beats per minute (07/08/20 0912) O2 Device: Nasal cannula (07/08/20 0912) O2 Flow Rate (L/min): 2 l/min (07/08/20 0912) Estimated body mass index is 32.9 kg/m² as calculated from the following: 
  Height as of this encounter: 5' 2\" (1.575 m). Weight as of this encounter: 81.6 kg (179 lb 14.3 oz). Intake/Output Summary (Last 24 hours) at 7/8/2020 1233 Last data filed at 7/8/2020 1522 Gross per 24 hour Intake 480 ml Output 850 ml Net -370 ml General appearance: Oriented and alert, cooperative, calm and medicated. No pain at present. Wheezy with mild SOB- xopenex prn . Obese. Head: Normocephalic, without obvious abnormality, atraumatic Eyes: conjunctivae/corneas clear. PERRL, EOM's gross intact.   
Throat: Lips, mucosa, and tongue normal. Teeth and gums normal 
 Neck: supple, symmetrical, trachea midline, symmetric, no carotid bruit and no JVD Lungs: Scattered wheezing, otherwise clear to auscultation bilaterally Heart: regular rate and rhythm, S1, S2 normal, no murmur, click, rub or gallop, sounds slightly muffled Abdomen: soft, non-tender. Bowel sounds normal. No masses,  no organomegaly Extremities: Intact cast to left forearm. All other extremities normal, atraumatic, no cyanosis or edema Skin: Skin color, texture, turgor normal. No rashes or lesions Neurologic: Grossly normal 
 
Current Med List in Hospital:  
Current Facility-Administered Medications Medication Dose Route Frequency  aspirin chewable tablet 81 mg  81 mg Oral DAILY  amLODIPine (NORVASC) tablet 5 mg  5 mg Oral DAILY  levalbuterol (XOPENEX) nebulizer soln 1.25 mg/3 mL  1.25 mg Nebulization Q4H PRN  
 acetaminophen (TYLENOL) tablet 650 mg  650 mg Oral Q6H PRN  
 atorvastatin (LIPITOR) tablet 40 mg  40 mg Oral QHS  budesonide (PULMICORT) 500 mcg/2 ml nebulizer suspension  500 mcg Nebulization BID RT  
 buPROPion SR (WELLBUTRIN SR) tablet 150 mg  150 mg Oral DAILY  famotidine (PF) (PEPCID) 20 mg in 0.9% sodium chloride 10 mL injection  20 mg IntraVENous DAILY  gabapentin (NEURONTIN) capsule 300 mg  300 mg Oral BID  labetaloL (NORMODYNE;TRANDATE) injection 10 mg  10 mg IntraVENous Q4H PRN  
 loratadine (CLARITIN) tablet 10 mg  10 mg Oral DAILY  metoprolol tartrate (LOPRESSOR) tablet 12.5 mg  12.5 mg Oral Q12H  
 morphine injection 2 mg  2 mg IntraVENous Q4H PRN  
 ondansetron (ZOFRAN) injection 4 mg  4 mg IntraVENous Q6H PRN  
 traMADoL (ULTRAM) tablet 50 mg  50 mg Oral TID PRN  
 morphine 10 mg/ml injection 5 mg  5 mg IntraVENous Q1H PRN Discharge Info:  
Current Discharge Medication List  
  
START taking these medications Details  
aspirin 81 mg chewable tablet Take 1 Tab by mouth daily. Qty: 30 Tab, Refills: 0 levalbuterol (XOPENEX) 1.25 mg/3 mL nebu 3 mL by Nebulization route every four (4) hours as needed for Wheezing, Shortness of Breath or Cough. Qty: 20 Nebule, Refills: 0 CONTINUE these medications which have NOT CHANGED Details  
amLODIPine (NORVASC) 10 mg tablet amlodipine 10 mg tablet Take 1 tablet every day by oral route. atorvastatin (LIPITOR) 40 mg tablet atorvastatin 40 mg tablet Take 1 tablet every day by oral route. budesonide (PULMICORT) 180 mcg/actuation aepb inhaler Pulmicort Flexhaler 180 mcg/actuation breath activated Inhale 1 puff twice a day by inhalation route. buPROPion XL (Wellbutrin XL) 150 mg tablet Wellbutrin  mg 24 hr tablet, extended release Take 1 tablet every day by oral route. cetirizine (ZYRTEC) 10 mg tablet cetirizine 10 mg tablet Take 1 tablet every day by oral route. cholecalciferol (VITAMIN D3) (2,000 UNITS /50 MCG) cap capsule Take 1 Cap by mouth. folic acid-vit M2-ONJ K37 (FOLTX) 2.5-25-2 mg tablet Take 1 Tab by mouth. furosemide (Lasix) 40 mg tablet Lasix 40 mg tablet Take 1 tablet every day by oral route. ferrous sulfate 325 mg (65 mg iron) tablet Take 1 Tab by mouth.  
  
gabapentin (NEURONTIN) 300 mg capsule gabapentin 300 mg capsule Take 1 capsule twice a day by oral route. metoprolol tartrate (LOPRESSOR) 25 mg tablet metoprolol tartrate 25 mg tablet Take 0.5 tablets twice a day by oral route for 30 days. omeprazole (PRILOSEC) 40 mg capsule omeprazole 40 mg capsule,delayed release Take 1 capsule twice a day by oral route. oxybutynin (DITROPAN) 5 mg tablet Take 5 mg by mouth.  
  
pantoprazole (PROTONIX) 40 mg tablet TAKE ONE TABLET EVERY DAY --PROTONIX GENERIC  
  
potassium chloride SR (K-TAB) 20 mEq tablet potassium chloride ER 20 mEq tablet,extended release Take 1 tablet every day by oral route.   
  
sucralfate (CARAFATE) 1 gram tablet TAKE ONE TABLET TWICE A DAY ONE HOUR BEFORE MEALS WITH A GLASS OF WATER ---CARAFATE GENERIC  
  
acetaminophen (Tylenol Extra Strength) 500 mg tablet Tylenol Extra Strength 500 mg tablet Take 1 tablet every 8 hours by oral route for 30 days. butalbital-acetaminophen-caffeine (FIORICET, ESGIC) -40 mg per tablet Take 1 Tab by mouth every four (4) hours as needed. isosorbide mononitrate ER (IMDUR) 30 mg tablet Take 15 mg by mouth. STOP taking these medications  
  
 aspirin (aspirin) 325 mg tablet Comments:  
Reason for Stopping:   
   
  
 
Time spent in patient discharge planning and coordination 35 minutes.  
 
Signed: 
Holly Almaraz NP

## 2020-07-08 NOTE — H&P
History and Physical    Patient: Johanny Martin MRN: [id number]  SSN: [id number]    YOB: 1932  Age: 80 y.o. Sex: female      Subjective:      Johanny Martin is a 80 y.o. female who has a hospice diagnosis of dissecting aortic aneurysm. Hospice associated diagnoses are atherosclerotic heart disease status post myocardial infarction, essential hypertension, and dyslipidemia. Non-associated diagnoses are breast cancer survivorship, renal insufficiency and GERD. She presented to the ER with chest pain on 7/6/20. She had taken two doses of nitroglycerin with some relief. EKG revealed bradycardia and AV block type II. First troponin was 19.3, 2nd was 548 by 2344 and on 7/7 at 0414 3rd troponin was 1328. D dimer was 7.56. She was sent for a CTA of the chest and it revealed a small left basilar pleural effusion with minimal adjacent atelectasis, several pulmonary nodules suspicious for malignancy and a DeBakey type 1 aortic dissection. The dissection extends from the aortic root across the aortic arch and down the descending thoracic aorta to the aortic hiatus, with an associated significant thrombosis throughout the origin of the proximal right brachiocephalic artery with high-grade stenosis. There was no evidence of PE. Patient was informed of results of scan and she stated that she did not want surgery. She has since lapsed into an obtunded state. Due to her decline, her family has elected to forgo further medical treatment and pursue comfort measures with hospice care. Without further treatment, her life expectancy is less than 5 days. Patient admitted with dissecting aortic aneurysm for management of pain, dyspnea and agitation.     Past Medical History:   Diagnosis Date    Chronic obstructive lung disease ([address] 2/7/2018    Depressive disorder 8/24/2017    Gastrointestinal disorder     History of hyperthyroidism 9/23/2014    thyroiditis    Hypertension     Osteopenia 7/5/2013    Patient is Rastafarian 9/2/2016    Peripheral neuropathy 7/5/2013    Psychiatric disorder     S/P laparoscopic cholecystectomy 5/5/2016     Past Surgical History:   Procedure Laterality Date    BREAST SURGERY PROCEDURE UNLISTED      HX APPENDECTOMY      HX CHOLECYSTECTOMY      HX HEENT        Family History   Problem Relation Age of Onset    No Known Problems Other      Social History     Tobacco Use    Smoking status: Never Smoker    Smokeless tobacco: Never Used   Substance Use Topics    Alcohol use: Not Currently      Prior to Admission medications    Medication Sig Start Date End Date Taking? Authorizing Provider   DULoxetine (CYMBALTA) 60 mg capsule Take 60 mg by mouth daily. 7/18/17  Yes Provider, Historical   nitroglycerin (NITROSTAT) 0.4 mg SL tablet 0.4 mg by SubLINGual route every five (5) minutes as needed for Chest Pain. 7/18/17  Yes Provider, Historical   aspirin 81 mg chewable tablet Take 1 Tab by mouth daily. 7/9/20   Spike Lewis, NP   levalbuterol (XOPENEX) 1.25 mg/3 mL nebu 3 mL by Nebulization route every four (4) hours as needed for Wheezing, Shortness of Breath or Cough. 7/8/20   Spike Lewis NP   budesonide-formoteroL (SYMBICORT) 160-4.5 mcg/actuation HFAA Take 2 Puffs by inhalation two (2) times a day. 6/29/20   Provider, Historical   exemestane (AROMASIN) 25 mg tablet Take 25 mg by mouth daily. 5/15/20   Provider, Historical   traMADoL (ULTRAM) 50 mg tablet Take 50 mg by mouth every eight (8) hours as needed for Pain. 7/3/20   Provider, Historical   amLODIPine (NORVASC) 10 mg tablet amlodipine 10 mg tablet   Take 1 tablet every day by oral route. 5/12/17   Other, MD Keshav   atorvastatin (LIPITOR) 40 mg tablet atorvastatin 40 mg tablet   Take 1 tablet every day by oral route. 1/26/17   Keshav Condon MD   budesonide (PULMICORT) 180 mcg/actuation aepb inhaler Pulmicort Flexhaler 180 mcg/actuation breath activated   Inhale 1 puff twice a day by inhalation route.  12/8/16 Keshav Condon MD   butalbital-acetaminophen-caffeine (FIORICET, ESGIC) -40 mg per tablet Take 1 Tab by mouth every four (4) hours as needed. 3/14/17   Keshav Condon MD   buPROPion XL (Wellbutrin XL) 150 mg tablet Wellbutrin  mg 24 hr tablet, extended release   Take 1 tablet every day by oral route. 5/22/17   Keshav Condon MD   cetirizine (ZYRTEC) 10 mg tablet cetirizine 10 mg tablet   Take 1 tablet every day by oral route. 4/21/17   Keshav Condon MD   cholecalciferol (VITAMIN D3) (2,000 UNITS /50 MCG) cap capsule Take 1 Cap by mouth daily. Keshav Condon MD   folic acid-vit B6-RKG V02 (FOLTX) 2.5-25-2 mg tablet Take 1 Tab by mouth daily. Keshav Condon MD   furosemide (Lasix) 40 mg tablet Lasix 40 mg tablet   Take 1 tablet every day by oral route. Keshav Condon MD   ferrous sulfate 325 mg (65 mg iron) tablet Take 1 Tab by mouth Daily (before breakfast). Keshav Condon MD   gabapentin (NEURONTIN) 300 mg capsule gabapentin 300 mg capsule   Take 1 capsule twice a day by oral route. 3/8/17   Keshav Condon MD   metoprolol tartrate (LOPRESSOR) 25 mg tablet metoprolol tartrate 25 mg tablet   Take 0.5 tablets twice a day by oral route for 30 days. 7/18/17   Keshav Condno MD   isosorbide mononitrate ER (IMDUR) 30 mg tablet Take 15 mg by mouth every morning. 10/10/19 10/9/20  Keshav Condon MD   oxybutynin (DITROPAN) 5 mg tablet Take 5 mg by mouth as needed for Bladder Spasms. Keshav Condon MD   pantoprazole (PROTONIX) 40 mg tablet TAKE ONE TABLET EVERY DAY --PROTONIX GENERIC 3/23/17   Keshav Condon MD   potassium chloride SR (K-TAB) 20 mEq tablet potassium chloride ER 20 mEq tablet,extended release   Take 1 tablet every day by oral route.  1/14/16   Keshav Condon MD   sucralfate (CARAFATE) 1 gram tablet TAKE ONE TABLET TWICE A DAY ONE HOUR BEFORE MEALS WITH A GLASS OF WATER ---CARAFATE GENERIC 2/27/17   Other, MD Keshav        Allergies   Allergen Reactions    Latex Rash    Penicillins Rash and Swelling     \"I look like someone beat me with a whip\"        Sulfa (Sulfonamide Antibiotics) Rash       Review of Systems:  Review of systems not obtained due to patient factors. Objective:     Vitals:    07/08/20 1352 07/08/20 1731   BP: 116/66 125/67   Pulse: 67 90   Resp:  16   Temp:  98.2 °F (36.8 °C)        Physical Exam:  GENERAL: moderate distress, appears older than stated age, pale, minimally responsive. LUNG: coarse diminished breath sounds with labored respirations. HEART: irregularly irregular rhythm  ABDOMEN: soft, non-tender. Bowel sounds active. Abdominal hernia noted. : Kapoor catheter with luis urine. EXTREMITIES:  extremities with no cyanosis or edema. Cast noted on left forearm. SKIN: Pale. Warm to touch. Peripheral IV in right forearm placed on admission. NEUROLOGIC: minimally responsive. No lateralizing deficits noted. Bedbound. PSYCHIATRIC: agitated with noxious stimuli.      Assessment:     Hospital Problems  Date Reviewed: 7/8/2020          Codes Class Noted POA    * (Principal) Dissecting aortic aneurysm ([facility] [address] ICD-10-CM: I71.00, I71.9  ICD-9-CM: 441.00  7/8/2020 Unknown        Atherosclerotic heart disease ICD-10-CM: I25.10  ICD-9-CM: 414.00  7/8/2020 Unknown        History of myocardial infarction ICD-10-CM: I25.2  ICD-9-CM: 412  7/8/2020 Unknown        Essential hypertension ICD-10-CM: I10  ICD-9-CM: 401.9  7/8/2020 Unknown        Dyslipidemia ICD-10-CM: E78.5  ICD-9-CM: 272.4  7/8/2020 Unknown        Chest pain ICD-10-CM: R07.9  ICD-9-CM: 786.50  7/6/2020 Yes              Plan:     Current Facility-Administered Medications   Medication Dose Route Frequency    sodium chloride (NS) flush 3 mL  3 mL IntraVENous Q12H    sodium chloride (NS) flush 3 mL  3 mL IntraVENous PRN    haloperidol lactate (HALDOL) injection 2 mg  2 mg SubCUTAneous Q1H PRN    Or    haloperidol lactate (HALDOL) injection 2 mg  2 mg IntraVENous Q1H PRN    acetaminophen (TYLENOL) suppository 650 mg  650 mg Rectal Q3H PRN    bisacodyL (DULCOLAX) suppository 10 mg  10 mg Rectal PRN    haloperidol lactate (HALDOL) injection 2 mg  2 mg SubCUTAneous Q1H PRN    Or    haloperidol lactate (HALDOL) injection 2 mg  2 mg IntraVENous Q1H PRN    glycopyrrolate (ROBINUL) injection 0.2 mg  0.2 mg IntraVENous Q4H PRN    morphine injection 4 mg  4 mg IntraVENous Q20MIN PRN    Or    morphine injection 4 mg  4 mg SubCUTAneous Q20MIN PRN    morphine injection 2 mg  2 mg SubCUTAneous Q6H    Or    morphine injection 2 mg  2 mg IntraVENous Q6H       7/8: (SFE) Admitted with Dissecting aortic aneurysm for management of pain, dyspnea and agitation. 1. Pain: Morphine 2mg IV/SQ Q6 hours scheduled. Morphine 4mg IV/SQ Q20 minutes as needed. 2. Dyspnea: Morphine 2mg IV/SQ Q6 hours scheduled. Morphine 4mg IV/SQ Q20 minutes as needed. Glycopyrrolate 0.2mg q4 prn secretions. Oxygen at 2 L/min prn.    3. Agitation: Haloperidol 2mg IV/SQ Q1 hour prn.    4. Family/Pt Support: Family at bedside during exam. Medications and plan of care discussed with nursing staff and family. Will continue to monitor for symptoms and adjust medications as needed to maintain patient comfort. PPS 10%. Case discussed with Dr. Justina Huntley. Add Morphine 2mg IV Q6. Catapres TTS 1 patch once weekly to lower blood pressure if family will provide.      Signed By: Ila Marinelli NP     July 8, 2020

## 2020-07-08 NOTE — PROGRESS NOTES
Problem: Falls - Risk of  Goal: *Absence of Falls  Description: Document Mayur Arley Fall Risk and appropriate interventions in the flowsheet. Outcome: Progressing Towards Goal  Note: Fall Risk Interventions:  Mobility Interventions: Bed/chair exit alarm    Mentation Interventions: Bed/chair exit alarm, Door open when patient unattended    Medication Interventions: Bed/chair exit alarm    Elimination Interventions: Bed/chair exit alarm    History of Falls Interventions: Bed/chair exit alarm, Door open when patient unattended         Problem: Patient Education: Go to Patient Education Activity  Goal: Patient/Family Education  Outcome: Resolved/Met     Problem: Pressure Injury - Risk of  Goal: *Prevention of pressure injury  Description: Document Wilver Scale and appropriate interventions in the flowsheet. Outcome: Progressing Towards Goal  Note: Pressure Injury Interventions:  Sensory Interventions: Assess changes in LOC, Float heels    Moisture Interventions: Absorbent underpads, Apply protective barrier, creams and emollients    Activity Interventions: Assess need for specialty bed    Mobility Interventions: Assess need for specialty bed, Float heels    Nutrition Interventions: Document food/fluid/supplement intake    Friction and Shear Interventions: Apply protective barrier, creams and emollients, Lift sheet                Problem: Hospice Orientation  Goal: Demonstrate understanding of hospice philosophy, plan of care, and home hospice program  Description: The patient/family/caregiver will demonstrate understanding of hospice philosophy, plan of care and the home hospice program as evidenced by participation in meeting the patient's psychosocial, spiritual, medical, and physical needs inclusive of medical supplies/equipment focusing on symptoms.   Outcome: Resolved/Met     Problem: Potential for Skin Breakdown  Goal: Demonstrate ability to care for skin, monitor areas of breakdown and demonstrate methods to prevent breakdown  Description: Patient/family/caregiver will demonstrate ability to care for patient's skin, monitor for areas of breakdown, and demonstrate methods to prevent breakdown during hospice care. Outcome: Progressing Towards Goal     Problem: Risk for Falls  Goal: Free of falls during episode of care  Description: Patient will be free of falls during episode of care. Outcome: Progressing Towards Goal     Problem: Pain  Goal: Verbalize satisfaction of level of comfort and symptom control  Outcome: Progressing Towards Goal  Note: Pt will be free of pain and/or medicated PRN to control pain AEB numeric pain scale less than 3/10. Problem: End of Life Process  Goal: Demonstrate understanding of end of life processes  Description: Patient/caregiver will understand end of life processes. Outcome: Progressing Towards Goal  Note: Family educated on EOL process. Questions answered. Family will verbalize and ask questions when they arise.

## 2020-07-08 NOTE — PROGRESS NOTES
Report received from off-going nurse, Renny Holden RN visual identification made, assumed care of pt. Pt resting quietly with eyes closed, no agitation or restlessness, no grimacing or groaning. Pt respirations unlabored. Tab alert in place, rails up x 2, bed in lowest position, safety maintained. FLACC 0.    1727 pt resting quietly, no grimacing, groaning or agitation. Pt purwick connected to 40 cc suction.     Report given to Richard Allen RN

## 2020-07-08 NOTE — PROGRESS NOTES
1346 - NOW dose of haldol 2mg subq and NOW dose of morphine 4mg subq given per orders. Primary RN aware. 1455 - PRN morphine 4mg subq given for agonal distress. Patient drank couple sips of water. Primary RN aware.

## 2020-07-08 NOTE — PROGRESS NOTES
Care Management Interventions PCP Verified by CM: Yes Mode of Transport at Discharge: BLS Transition of Care Consult (CM Consult): [facility]osp[facility] Current Support Network: Own Home Confirm Follow Up Transport: Family Discharge Location Discharge Placement: Other:(Hospice House) Rec'd call from Elba Levine with [facility]. They have accepted patient for admission.

## 2020-07-08 NOTE — PHYSICIAN ADVISORY
Letter of Status Determination:  
Recommend hospitalization status upgraded from OBSERVATION  to INPATIENT  Status Pt Name:  Dylon Mike MR#  
EULALIO # [id number] / 
[id number] Payor: BLUE CROSS MEDICARE / Plan[place] / Product Type: Managed Care Medicare /   
EDIN#  [id number] [facility]  330/01  @ [address] [place] [address] Hospitalization date  7/6/2020  4:02 PM  
Current Attending Physician  Donnette Apley, Deon Zapien,* Principal diagnosis  Chest pain Clinicals  Patient is a 80 y.o. female who presents to the ER due to chest pain that started about 1 hour prior to arrival.  She reported substernal chest pain that was severe at onset, frequent falls, CTA chest w/ Developing type I aortic dissection with aneurysmal dilatation, Labile BP, high risk for decompensation, Pt refused surgery, palliative consult pending [place] (MCG) criteria ORG: S-130 (ISC) STATUS DETERMINATION  inpatient The final decision of the patient's hospitalization status depends on the attending physician's judgment Additional comments Payor: BLUE CROSS MEDICARE / [place] / Product Type: Managed Care Medicare /   
  
 
Juan F Cagle MD 
Cell: [phone] Physician Advisor

## 2020-07-08 NOTE — PROGRESS NOTES
5149  Received report from [facility], RN. Pt Id by name and . 194  Pt lying in bed with eyes closed. No facial grimace. No moans. Flacc =0-1. Resp irreg on 2 L n/c. Lungs coarse. HR irreg. BS hypo. No edema noted. Pt has cast on left arm. Clean/dry/intact. Purewik draining/patent. SR up x 2. Bed low/locked. Call light with in reach. Door opened.   Scheduled ns flush given along with PRN glycopyrrolate 0.2 mg IVP. Increased secretions. SR up x 2. Bed low/locked. Call light with in reach. Door opened.   Pt with large amount of beige secretions. Pt suctioned. Moderate amount of beige thick secretions removed. Purewik removed and brief placed. Pt repositioned in bed. SR up x 2. Bed low/locked. Call light with in reach. Door opened.   Scheduled Morphine 2 mg IVP given. Pt resting comfortably. 0136  Pt with eyes closed. No facial grimace. Flacc =0-1. Resp irreg shallow on 2 L n/c. Increased secretions noted. Pt suctioned. Moderate amount of beige thick secretions removed. SR up x 2. Bed low/locked. Call light with in reach. Door opened. 0321  Pt resting. No facial grimace. No moans. Flacc =0-1. Resp shallow irreg on 2 L n/c.  Lungs coarse. HOB elevated. SR up x 2. Bed low/locked. Call light with in reach. Door opened. 0543  Pt with increased secretions. Resp irreg on 2 L n/c. No facial grimace. Flacc =0-1. Pt suctioned. Large amount of beige thick secretions removed. Scheduled morphine 2 mg IVP given along with glycopyrrolate 0.2 mg IVP. SR up x 2. Bed low/locked. Call light with in reach. Door opened. Report given to [facility], RN.

## 2020-07-08 NOTE — PROGRESS NOTES
Pt resting quietly in bed,  No c/o pain at this time. Restarted pt's IVF's at 50ml/hr. Pt alert and oriented. Took her medication without problems. Tele monitor  #1811 in place,   purewick in place and draining clear yellow urine. Bed is in low locked position and call light is within reach. Bed alarm is ON. Pt aware she isn't supposed to get up.

## 2020-07-09 NOTE — PROGRESS NOTES
Progress Note    Patient: Taylor Silvestre MRN: [id number]  SSN: [id number]    YOB: 1932  Age: 80 y.o. Sex: female      Admit Date: 7/8/2020    LOS: 1 day     Subjective:     Unresponsive. NPO. Copious secretions requiring suction multiple times. Catapres TTS-1 patch applied this morning to help lower blood pressure. Glycopyrrolate IV x 3 for secretions. Morphine 4mg IV x 2 for pain/dyspnea. Review of Systems:  Review of systems not obtained due to patient factors. Objective:     Vitals:    07/08/20 1352 07/08/20 1731 07/09/20 0532 07/09/20 1024   BP: 116/66 125/67 103/54 139/74   Pulse: 67 90 82 85   Resp:  16 10    Temp:  98.2 °F (36.8 °C) 100 °F (37.8 °C)         Intake and Output:  Current Shift: No intake/output data recorded. Last three shifts: No intake/output data recorded. Physical Exam:   GENERAL: moderate distress, appears older than stated age, pale, unresponsive. LUNG: coarse diminished breath sounds with labored respirations. Copious secretions. HEART: irregularly irregular rhythm  ABDOMEN: soft, non-tender. Bowel sounds active. Abdominal hernia noted. : Kapoor catheter with luis urine. EXTREMITIES:  extremities with no cyanosis or edema. Cast noted on left forearm. SKIN: Pale. Warm to touch. Peripheral IV in right forearm with dressing intact. NEUROLOGIC: Unresponsive. No lateralizing deficits noted. Bedbound. Lab/Data Review:  No new labs resulted in the last 24 hours.     Assessment:     Principal Problem:    Dissecting aortic aneurysm ([address] (7/8/2020)    Active Problems:    Chest pain (7/6/2020)      Atherosclerotic heart disease (7/8/2020)      History of myocardial infarction (7/8/2020)      Essential hypertension (7/8/2020)      Dyslipidemia (7/8/2020)        Plan:     Current Facility-Administered Medications   Medication Dose Route Frequency    cloNIDine (CATAPRES) 0.1 mg/24 hr patch 1 Patch (Patient Supplied)  1 Patch TransDERmal Q7D    sodium chloride (NS) flush 3 mL  3 mL IntraVENous Q12H    sodium chloride (NS) flush 3 mL  3 mL IntraVENous PRN    haloperidol lactate (HALDOL) injection 2 mg  2 mg SubCUTAneous Q1H PRN    Or    haloperidol lactate (HALDOL) injection 2 mg  2 mg IntraVENous Q1H PRN    acetaminophen (TYLENOL) suppository 650 mg  650 mg Rectal Q3H PRN    bisacodyL (DULCOLAX) suppository 10 mg  10 mg Rectal PRN    haloperidol lactate (HALDOL) injection 2 mg  2 mg SubCUTAneous Q1H PRN    Or    haloperidol lactate (HALDOL) injection 2 mg  2 mg IntraVENous Q1H PRN    glycopyrrolate (ROBINUL) injection 0.2 mg  0.2 mg IntraVENous Q4H PRN    morphine injection 4 mg  4 mg IntraVENous Q20MIN PRN    Or    morphine injection 4 mg  4 mg SubCUTAneous Q20MIN PRN    morphine injection 2 mg  2 mg SubCUTAneous Q6H    Or    morphine injection 2 mg  2 mg IntraVENous Q6H       7/8: (SFE) Admitted with Dissecting aortic aneurysm for management of pain, dyspnea and agitation.     1. Pain: Morphine 2mg IV/SQ Q6 hours scheduled. Morphine 4mg IV/SQ Q20 minutes as needed.      2. Dyspnea: Morphine 2mg IV/SQ Q6 hours scheduled. Morphine 4mg IV/SQ Q20 minutes as needed. Glycopyrrolate 0.2mg q4 prn secretions. Oxygen at 2 L/min prn.     3. Agitation: Haloperidol 2mg IV/SQ Q1 hour prn.     4. Family/Pt Support: Family at bedside during exam. Medications and plan of care discussed with nursing staff and family. Will continue to monitor for symptoms and adjust medications as needed to maintain patient comfort. PPS 10%. Case discussed with Dr. Corby Nicole and in [facility]OW[facility] meeting today.        7/8: Add Morphine 2mg IV Q6. Catapres TTS 1 patch once weekly to lower blood pressure. 7/9: No changes in plan of care. Expect patient demise in the next 24 hours.         Signed By: Mary Hawthorne NP     July 9, 2020

## 2020-07-09 NOTE — [facility] NOTES
Patient: Guillermina Harp    Date: 07/09/20  Time: 8:39 AM    [facility]  Notes     has reviewed and agrees with the initial POC.  will provide spiritual care and assist with bereavement needs as appropriate.  will collaborate with Care Team to provide Good Help.               Signed by: Ashley Lerma

## 2020-07-09 NOTE — [facility] IDG MASTER NOTE
[facility]  Date: 07/09/20  Time: 4:06 PM    ___________________    Patient: Maegan Dai  Coverage Information:     Pay[place]: [place] MED[place]ARE     Plan: [place] MEDICARE PART A AND B     Subscriber ID: [id number]     Phone Number:   MRN: [id number]  Current Benefit Period: Benefit Period 1  Start Date: 7/8/2020  End Date: 10/5/2020      Medical Director: Alicia Guadalupe MD   Hospice Attending Provider: Alicia Guadalupe MD  [address] [place] [address]  [id number]  Phone: [phone]  Fax: [phone]    Level of Care: General Inpatient Care      ___________________    Diagnoses: There were no encounter diagnoses.     Current Medications:    Current Facility-Administered Medications:     cloNIDine (CATAPRES) 0.1 mg/24 hr patch 1 Patch (Patient Supplied), 1 Patch, TransDERmal, Q7D, Edilberto Adam, RONAK, 1 Patch at 07/09/20 1024    sodium chloride (NS) flush 3 mL, 3 mL, IntraVENous, Q12H, Edilberto Adam, NP, 3 mL at 07/09/20 0804    sodium chloride (NS) flush 3 mL, 3 mL, IntraVENous, PRN, Edilberto Adam, NP, 3 mL at 07/09/20 1227    haloperidol lactate (HALDOL) injection 2 mg, 2 mg, SubCUTAneous, Q1H PRN **OR** haloperidol lactate (HALDOL) injection 2 mg, 2 mg, IntraVENous, Q1H PRN, Edilberto Adam NP    acetaminophen (TYLENOL) suppository 650 mg, 650 mg, Rectal, Q3H PRN, Edilberto Adam, NP    bisacodyL (DULCOLAX) suppository 10 mg, 10 mg, Rectal, PRN, Edilberto Adam, NP    haloperidol lactate (HALDOL) injection 2 mg, 2 mg, SubCUTAneous, Q1H PRN **OR** haloperidol lactate (HALDOL) injection 2 mg, 2 mg, IntraVENous, Q1H PRN, Edilberto Adam, NP    glycopyrrolate (ROBINUL) injection 0.2 mg, 0.2 mg, IntraVENous, Q4H PRN, Edilberto Adam NP, 0.2 mg at 07/09/20 1030    morphine injection 4 mg, 4 mg, IntraVENous, Q20MIN PRN **OR** morphine injection 4 mg, 4 mg, SubCUTAneous, Q20MIN PRN, Edilberto Adam NP, 4 mg at 07/08/20 1455    morphine injection 2 mg, 2 mg, SubCUTAneous, Q6H **OR** morphine injection 2 mg, 2 mg, IntraVENous, Q6H, Kassi Horne NP, 2 mg at 07/09/20 1227    Orders:  Orders Placed This Encounter    IP CONSULT TO SPIRITUAL CARE Once on week one, then PRN. For [facility] patients only. For contracted patients, primary hospice will continue to manage spiritual care needs     Once on week one, then PRN. For [facility] patients only. For contracted patients, primary hospice will continue to manage spiritual care needs     Standing Status:   Standing     Number of Occurrences:   1     Order Specific Question:   Reason for Consult: Answer:   Spiritual crisis intervention or per patient or caregiver request    DIET PLEASURE     Standing Status:   Standing     Number of Occurrences:   1     Order Specific Question:   Likes/Dislikes/Preferences     Answer:   hold tray    VITAL SIGNS     Standing Status:   Standing     Number of Occurrences:   1    VITAL SIGNS     Standing Status:   Standing     Number of Occurrences:   1    NURSING-MISCELLANEOUS: comfort measures Enter comfort measures above. CONTINUOUS     Enter comfort measures above. Standing Status:   Standing     Number of Occurrences:   1     Order Specific Question:   Description of Order:     Answer:   comfort measures    GARCIA CATHETER, CARE     1. Garcia Catheter care every shift and PRN  2. Notify Physician of Garcia Catheter leakage, occlusion, gross adherent sediment or accidental removal  3. Change Garcia 30 days after insertion. 4. May flush catheter prn leakage or gross adherent sediment or mucus. Standing Status:   Standing     Number of Occurrences:   1    BLADDER CHECKS     May scan bladder PRN for urinary retention and or patient discomfort     Standing Status:   Standing     Number of Occurrences:   1    NURSING ASSESSMENT:  SPECIFY Assess for GIP, routine, or respite level of care.  Q SHIFT Routine     Standing Status:   Standing     Number of Occurrences:   1     Order Specific Question:   Please describe the test or procedure you would like to order. Answer:   Assess for GIP, routine, or respite level of care.  PAIN ASSESSMENT Pain and Symptoms: Assess ever 4 hours and PRN, for GIP level of care. PRN Routine     Standing Status:   Standing     Number of Occurrences:   1     Order Specific Question:   Please describe the test or procedure you would like to order. Answer:   Pain and Symptoms: Assess ever 4 hours and PRN, for GIP level of care.  BEDREST, COMPLETE     Standing Status:   Standing     Number of Occurrences:   1    NURSING-MISCELLANEOUS: May leave purewick device in place to collect urine. CONTINUOUS     Standing Status:   Standing     Number of Occurrences:   1     Order Specific Question:   Description of Order:     Answer: May leave purewick device in place to collect urine.  NURSING-MISCELLANEOUS: admit 7/8: (SFE) Admitted with Dissecting aortic aneurysm for management of pain, dyspnea and agitation. This is an order to admit Trejanellea Bill to inpatient hospice care at the [address] [place] [address], for first 90-day benefi. .. 7/8: (SFE) Admitted with Dissecting aortic aneurysm for management of pain, dyspnea and agitation. This is an order to admit Tresia Bill to inpatient hospice care at the [address] [place] [address], for first 90-day benefit interval.  She is an 80-year-old woman who is experiencing a class I dissecting aortic aneurysm who has chosen against surgical intervention. She has requested and her POA has supported a plan to limit care to comfort measures only. Her life expectancy under the circumstances is less than 5 days. She is requiring large doses of parenteral opioids for pain management and anxiety. 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   Atherosclerotic heart disease status post myocardial infarction, essential hypertension, and dyslipidemia are related diagnoses. Breast cancer survivorship, renal insufficiency and GERD are unrelated diagnoses which have a moderate adverse effect on this dismal prognosis. Standing Status:   Standing     Number of Occurrences:   1     Order Specific Question:   Description of Order:     Answer:   admit    DO NOT RESUSCITATE     Standing Status:   Standing     Number of Occurrences:   1    OXYGEN CANNULA Liters per minute: 2; Indications for O2 therapy: RESPIRATORY DISTRESS PRN Routine     Standing Status:   Standing     Number of Occurrences:   1     Order Specific Question:   Liters per minute: Answer:   2     Order Specific Question:   Indications for O2 therapy     Answer:   RESPIRATORY DISTRESS    morphine injection 4 mg    haloperidol lactate (HALDOL) injection 2 mg    sodium chloride (NS) flush 3 mL    sodium chloride (NS) flush 3 mL    OR Linked Order Group     haloperidol lactate (HALDOL) injection 2 mg     haloperidol lactate (HALDOL) injection 2 mg    acetaminophen (TYLENOL) suppository 650 mg    bisacodyL (DULCOLAX) suppository 10 mg    OR Linked Order Group     haloperidol lactate (HALDOL) injection 2 mg     haloperidol lactate (HALDOL) injection 2 mg    glycopyrrolate (ROBINUL) injection 0.2 mg    OR Linked Order Group     morphine injection 4 mg     morphine injection 4 mg    OR Linked Order Group     morphine injection 2 mg     morphine injection 2 mg    budesonide-formoteroL (SYMBICORT) 160-4.5 mcg/actuation HFAA     Sig: Take 2 Puffs by inhalation two (2) times a day.  DULoxetine (CYMBALTA) 60 mg capsule     Sig: Take 60 mg by mouth daily.  exemestane (AROMASIN) 25 mg tablet     Sig: Take 25 mg by mouth daily.  DISCONTD: metoprolol succinate (TOPROL-XL) 25 mg XL tablet     Sig: Take 12.5 mg by mouth daily.     nitroglycerin (NITROSTAT) 0.4 mg SL tablet     Si.4 mg by SubLINGual route every five (5) minutes as needed for Chest Pain.  traMADoL (ULTRAM) 50 mg tablet     Sig: Take 50 mg by mouth every eight (8) hours as needed for Pain.  cloNIDine (CATAPRES) 0.1 mg/24 hr patch 1 Patch (Patient Supplied)     Order Specific Question:   Specific disease being treated with this drug. Answer:   Hypertension and dissecting aortic aneurysm     Order Specific Question:   Is this requested medication unique in class, structure or indication     Answer:   Yes     Order Specific Question:   Reasons for patient to receive nonformulary medication     Answer:   npo    INITIAL PHYSICIAN ORDER: HOSPICE Level Of Care: General Inpatient; Reason for Admission: 7/8: (SFE) Admitted with Dissecting aortic aneurysm for management of pain, dyspnea and agitation. Standing Status:   Standing     Number of Occurrences:   1     Order Specific Question:   Status     Answer:   Hospice     Order Specific Question:   Level Of Care     Answer:   General Inpatient     Order Specific Question:   Reason for Admission     Answer:   7/8: (SFE) Admitted with Dissecting aortic aneurysm for management of pain, dyspnea and agitation. Order Specific Question:   Inpatient Hospitalization Certified Necessary for the Following Reasons     Answer:   3. Patient receiving treatment that can only be provided in an inpatient setting (further clarification in H&P documentation)     Order Specific Question:   Admitting Diagnosis     Answer:   Dissecting aortic aneurysm [facility]) [[id number]]     Order Specific Question:   Terminal Prognosis Diagnosis(es)     Answer:   Dissecting aortic aneurysm [facility]) [[id number]]     Order Specific Question:   Admitting Physician     Answer:   Francisco Rossi     Order Specific Question:   Attending Physician     Answer:   Francisco Rossi     Order Specific Question:   Discharge Plan:     Answer:    Other (Specify)    IP CONSULT TO SOCIAL WORK     Once on week one, then PRN for Psychosocial crisis intervention or per patient or caregiver request.  For [facility] patients only. For contracted patients, primary hospice will continue to manage social work needs. Standing Status:   Standing     Number of Occurrences:   1     Order Specific Question:   Reason for Consult: Answer: Once on week one, then PRN for Psychosocial crisis intervention or per patient or caregiver request.       Allergies: Allergies   Allergen Reactions    Latex Rash    Penicillins Rash and Swelling     \"I look like someone beat me with a whip\"        Sulfa (Sulfonamide Antibiotics) Rash       Care Plan:  Multidisciplinary Problems (Active)     Problem: Aide Supervisory Visit     Dates: Start: 07/08/20       Description:     Disciplines: Interdisciplinary          Problem: Alteration in Mobility     Dates: Start: 07/08/20       Description:     Disciplines: Interdisciplinary          Problem: Anticipatory Grief     Dates: Start: 07/08/20       Description:     Disciplines: Interdisciplinary    Goal: Explore reactions to and verbalize acceptance of impending loss     Dates: Start: 07/08/20       Description: Patient/family/caregiver will explore reactions to and verbalize acceptance of impending loss. Disciplines: Interdisciplinary    Intervention: Assess grief responses     Dates: Start: 07/08/20       Description:           Intervention: Assist with grief counseling     Dates: Start: 07/08/20       Description:  to assist.          Intervention: Vance Elizalde on stages of grief     Dates: Start: 07/08/20       Description: Instruct patient/caregiver on stages of grief. Intervention: Offer grief support group     Dates: Start: 07/08/20       Description: Patient/family/caregiver will explore reactions to and verbalize acceptance of impending loss.                       Problem: Anxiety/Agitation     Dates: Start: 07/08/20       Description:     Disciplines: Interdisciplinary    Goal: Verbalize and demonstrate ability to manage anxiety     Dates: Start: 07/08/20   Expected End: 07/18/20       Description: The patient/family/caregiver will verbalize and demonstrate ability to manage the patient's anxiety throughout hospice care. Disciplines: Interdisciplinary    Intervention: Assess for anxiety/agitation     Dates: Start: 07/08/20       Description: Assess for signs and symptoms of anxiety and agitation. Intervention: Instruction strategies to reduce anxiety/agitation     Dates: Start: 07/08/20       Description: Instruct patient/caregiver on strategies to reduce anxiety/agitation. Problem: Comfort Deficit     Dates: Start: 07/08/20       Description:     Disciplines: Interdisciplinary          Problem: Communication Deficit     Dates: Start: 07/08/20       Description:     Disciplines: Interdisciplinary          Problem: Coping and Emotional Distress     Dates: Start: 07/08/20       Description:     Disciplines: Interdisciplinary    Goal: Demonstrate acceptance of terminal illness and understanding of disease progression     Dates: Start: 07/08/20       Description: Patient/family/caregiver will demonstrate acceptance of terminal disease and understanding of disease progression while employing appropriate coping mechanisms. Disciplines: Interdisciplinary    Intervention: Assess for alteration in coping     Dates: Start: 07/08/20       Description:           Intervention: Assess for signs/symptoms of emotional distress     Dates: Start: 07/08/20       Description:           Intervention: Instruct on effective coping skills     Dates: Start: 07/08/20       Description: Instruct patient/caregiver on effective coping skills. Intervention: Instruct on strategies to reduce emotional distress     Dates: Start: 07/08/20       Description: Instruct patient/caregiver on strategies to reduce emotional distress.                       Problem: Depression     Dates: Start: 07/08/20       Description:     Disciplines: Interdisciplinary          Problem: End of Life Process     Dates: Start: 07/08/20       Description:     Disciplines: Interdisciplinary    Goal: Demonstrate understanding of end of life processes     Dates: Start: 07/08/20   Expected End: 07/11/20       Description: Vasyl Robert will understand end of life processes. Disciplines: Interdisciplinary    Intervention: Assess for signs/symptoms of terminal restlessness     Dates: Start: 07/08/20       Description:           Intervention: Instruct on strategies to reduce terminal restlessness     Dates: Start: 07/08/20       Description:           Intervention: Instruct on the dying process     Dates: Start: 07/08/20       Description:           Intervention: Instruct: imminent death     Dates: Start: 07/08/20       Description:           Intervention: Instruct: process at end of life     Dates: Start: 07/08/20       Description:                       Problem: Falls - Risk of     Dates: Start: 07/08/20       Description:     Disciplines: Interdisciplinary    Goal: *Absence of Falls     Dates: Start: 07/08/20   Expected End: 07/11/20       Description: Document Manolo Garcia Fall Risk and appropriate interventions in the flowsheet.     Disciplines: Interdisciplinary                Problem: Home Safety     Dates: Start: 07/08/20       Description:     Disciplines: Interdisciplinary          Problem: Management of Home Medications     Dates: Start: 07/08/20       Description:     Disciplines: Interdisciplinary          Problem: Pain     Dates: Start: 07/08/20       Description:     Disciplines: Interdisciplinary    Goal: Verbalize satisfaction of level of comfort and symptom control     Dates: Start: 07/08/20   Expected End: 07/11/20       Description:     Disciplines: Interdisciplinary    Intervention: Assess effectiveness of pain management     Dates: Start: 07/08/20       Description:           Intervention: Assess for signs/symptoms of acute pain     Dates: Start: 07/08/20       Description:           Intervention: Assess for signs/symptoms of chronic pain     Dates: Start: 07/08/20       Description:           Intervention: Instruct on non-pharmacological pain management     Dates: Start: 07/08/20       Description:           Intervention: Instruct on pain scales     Dates: Start: 07/08/20       Description:           Intervention: Instruct on pharmacological pain management     Dates: Start: 07/08/20       Description:                       Problem: Patient Education: Go to Patient Education Activity     Dates: Start: 07/08/20       Description:     Disciplines: Interdisciplinary          Problem: Potential for Skin Breakdown     Dates: Start: 07/08/20       Description:     Disciplines: Interdisciplinary    Goal: Demonstrate ability to care for skin, monitor areas of breakdown and demonstrate methods to prevent breakdown     Dates: Start: 07/08/20   Expected End: 07/11/20       Description: Patient/family/caregiver will demonstrate ability to care for patient's skin, monitor for areas of breakdown, and demonstrate methods to prevent breakdown during hospice care. Disciplines: Interdisciplinary    Intervention: Assess for skin breakdown     Dates: Start: 07/08/20       Description:           Intervention: Freda Moe on strategies to reduce risk of skin breakdown     Dates: Start: 07/08/20       Description:                       Problem: Pressure Injury - Risk of     Dates: Start: 07/08/20       Description:     Disciplines: Interdisciplinary    Goal: *Prevention of pressure injury     Dates: Start: 07/08/20   Expected End: 07/17/20       Description: Document Wilver Scale and appropriate interventions in the flowsheet. Disciplines: Interdisciplinary                Problem: Psychosocial General     Dates: Start: 07/09/20       Description: Problem: SW: Patient Standard/ Initial Plan of Care-Social Work    Goals: 1.  Patient and family will experience a smooth and safe transition to hospice care   2. Long Term Goal: To minimize pain and discomfort while maximizing quality of life for the \  patient and providing support for the family /patient caregiver. Intervention: 1. Social Work Assessment            2. Assess patient /family/ caregiver expectations from hospice. 3. Assess desires regarding end-of-life issues. 4. Assess degrees and stages of grief. 5. Assessment of pain and coping at every visit. 6. Assess the patient psycho-social, emotional cultural implications of pain. 7. Assess mental/ emotional status and need for additional resources every visit. 8. Assess PCG anxiety and fatigue in care plan delivery. Disciplines: Interdisciplinary          Problem: Risk for Falls     Dates: Start: 07/08/20       Description:     Disciplines: Interdisciplinary    Goal: Free of falls during episode of care     Dates: Start: 07/08/20   Expected End: 07/11/20       Description: Patient will be free of falls during episode of care. Disciplines: Interdisciplinary    Intervention: Assess fall risk     Dates: Start: 07/08/20       Description: Complete fall risk assessment on admission, recertification, and as indicated on fall. Intervention: Instruct mobility     Dates: Start: 07/08/20       Description: Teach patient/caregiver/family techniques to increase patient's mobility: range of motion exercises, assisting with ambulation, proper usage of mobility assistive devices, use of side rails to define bed perimeter and to assist with turning and positioning. Intervention: Instruct on fall prevention     Dates: Start: 07/08/20       Description: Instruct patient/family in fall prevention strategies:  lighted hallways, remove throw rugs, monitor medication that may alter mental status.                       Problem: Spiritual Distress Dates: Start: 07/08/20       Description:     Disciplines: Interdisciplinary    Goal: Distress heard, acknowledged, and addressed     Dates: Start: 07/08/20       Description: Patient/family/caregiver distress will be heard, acknowledged, and addressed throughout hospice care. Disciplines: Interdisciplinary    Intervention: Assess for signs/symptoms of spiritual distress     Dates: Start: 07/08/20       Description:           Intervention: Consult on spiritual care     Dates: Start: 07/08/20       Description: Consult to Spiritual Care          Intervention: Discuss strategies to reduce spiritual distress     Dates: Start: 07/08/20       Description: Discuss with patient/caregiver strategies to reduce spiritual distress.                         Care Plan Problems/Goals      Progressing Towards Goal (7)      *Absence of Falls (Falls - Risk of)    Disciplines:  Interdisciplinary Expected end:  07/11/20        Outcome: Progressing Towards Goal By Bowen Moody RN on 07/09/20 1340            *Prevention of pressure injury (Pressure Injury - Risk of)    Disciplines:  Interdisciplinary Expected end:  07/17/20        Outcome: Progressing Towards Goal By Danna Mackay RN on 07/09/20 3502            Demonstrate ability to care for skin, monitor areas of breakdown and demonstrate methods to prevent breakdown (Potential for Skin Breakdown)    Disciplines:  Interdisciplinary Expected end:  07/11/20        Outcome: Progressing Towards Goal By Danna Mackay RN on 07/09/20 2405            Free of falls during episode of care (Risk for Falls)    Disciplines:  Interdisciplinary Expected end:  07/11/20        Outcome: Progressing Towards Goal By Danna Mackay RN on 07/09/20 1483            Verbalize satisfaction of level of comfort and symptom control (Pain)    Disciplines:  Interdisciplinary Expected end:  07/11/20        Outcome: Progressing Towards Goal By Danna Mackay RN on 07/09/20 1995 Verbalize and demonstrate ability to manage anxiety (Anxiety/Agitation)    Disciplines:  Interdisciplinary Expected end:  07/18/20        Outcome: Progressing Towards Goal By Angel Leon RN on 07/09/20 8046            Demonstrate understanding of end of life processes (End of Life Process)    Disciplines:  Interdisciplinary Expected end:  07/11/20        Outcome: Progressing Towards Goal By Cameron Bryson RN on 07/08/20 1527                         No Outcome (3)      Explore reactions to and verbalize acceptance of impending loss (Anticipatory Grief)    Disciplines:  Interdisciplinary Expected end:  -          Demonstrate acceptance of terminal illness and understanding of disease progression (Coping and Emotional Distress)    Disciplines:  Interdisciplinary Expected end:  -          Distress heard, acknowledged, and addressed (Spiritual Distress)    Disciplines:  Interdisciplinary Expected end:  -                       Resolved/Met (2)      Patient/Family Education (Patient Education: Go to Patient Education Activity)    Disciplines:  Interdisciplinary Expected end:  -        Outcome: Resolved/Met By Cameron Bryson RN on 07/08/20 1527            Demonstrate understanding of hospice philosophy, plan of care, and home hospice program [facility] Orientation)    Disciplines:  Interdisciplinary Expected end:  -        Outcome: Resolved/Met By Cameron Bryson RN on 07/08/20 1527                              ___________________    Care Team Notes          POC/IDG Notes      [facility] IDG Nurse Notes by Shiv Lawson at 07/09/20 1605  Version 1 of 1    Author:  Shiv Lawson Service:  Licensed Clinical  Author Type:      Filed:  07/09/20 4920 Date of Service:  07/09/20 1605 Status:  Signed    :  Shiv Lawson ()         Patient: Anisa Lopez    Date: 07/09/20  Time: 4:05 PM    [facility] Nurse Notes    IDG NOTE     Subjective:   Unresponsive.  Copious secretions requiring suction multiple times. Catapres TTS-1 patch applied this morning to help lower blood pressure.      Intake:  NPO.      Scheduled medications:         Current Facility-Administered Medications   Medication Dose Route Frequency    cloNIDine (CATAPRES) 0.1 mg/24 hr patch 1 Patch (Patient Supplied)  1 Patch TransDERmal Q7D    sodium chloride (NS) flush 3 mL  3 mL IntraVENous Q12H    morphine injection 2 mg  2 mg SubCUTAneous Q6H     Or    morphine injection 2 mg  2 mg IntraVENous Q6H        PRN medications/symptoms:  Glycopyrrolate IV x 3 for secretions. Morphine 4mg IV x 2 for pain/dyspnea.      Wounds:  N/A     Output: Kapoor catheter inserted this am. Angi Stanley removed last evening.      IV access: right forearm PIV       Oxygen: 2  L/min via NC      Patient Vitals for the past 24 hrs:    Temp Pulse Resp BP   07/09/20 1024 -- 85 -- 139/74   07/09/20 0532 100 °F (37.8 °C) 82 10 103/54   07/08/20 1731 98.2 °F (36.8 °C) 90 16 125/67   07/08/20 1352 -- 67 -- 116/66        Changes in plan of care:  7/8: Add Morphine 2mg IV Q6. Catapres TTS 1 patch once weekly. 7/9: No changes in plan of care. Expect patient demise in the next 24 hours.      Comprehensive plan of care reviewed. IDG and pt./family in agreement with plan of care. The IDG identifies through on-going assessment when a change is needed to the POC; the pt/family will receive care and services necessitated by changes in POC.  Medications reviewed by the pharmacist and Medical Director.  [address]  07/09/20               Signed by: Jose Avery [facility]  Notes by Clarice Ramirez at 07/09/20 1254  Version 1 of 1    Author:  Clarice Ramirez Service:  Spiritual Care Author Type:  Pastoral Care    Filed:  07/09/20 1254 Date of Service:  07/09/20 1254 Status:  Signed    :  Clarice Ramirez (Pastoral Care)       Patient: Estefanía Mcneil    Date: 07/09/20  Time: 12:54 PM    [facility]  Notes    Assessment pending for spiritual and bereavement care. Signed by: Colleen Olivarez       [address] [place] [address]  Notes by Althea Orta at 07/09/20 1157  Version 1 of 1    Author:  Althea Orta Service:  Licensed Clinical  Author Type:      Filed:  07/09/20 1252 Date of Service:  07/09/20 1157 Status:  Signed    :  Althea Orta ()       Patient: Oscar Guo    Date: 07/09/20  Time: 11:57 AM    [facility]  Notes  LMSW went to complete the SW assessment. Pt is unaccompanied at this time and unable to participate in assessment. LMSW will try again later and/or call family  The volunteer program has been suspended due to the Covid-19 virus. Signed by: Jodelle Burkitt       [facility]  Notes by Danielle Steele at 07/09/20 0800  Version 1 of 1    Author:  Danielle Steele Service:  Spiritual Care Author Type:  Pastoral Care    Filed:  07/09/20 0840 Date of Service:  07/09/20 4434 Status:  Signed    :  Danielle Steele (Pastoral Care)       Patient: Oscar Guo    Date: 07/09/20  Time: 8:39 AM    [facility]  Notes     has reviewed and agrees with the initial POC.  will provide spiritual care and assist with bereavement needs as appropriate.  will collaborate with Care Team to provide Good Help. Signed by: Anna Bennett       [address] [place] [address]  Notes by Althea Orta at 07/09/20 7970  Version 1 of 1    Author:  Althea Orta Service:  Licensed Clinical  Author Type:      Filed:  07/09/20 0824 Date of Service:  07/09/20 0824 Status:  Signed    :  Althea Orta ()       Patient: Oscar Guo    Date: 07/09/20  Time: 8:24 AM    [facility]  Notes  The volunteer program has been suspended due to the Covid-19 virus. LMSW will ensure the pt and families receive their comfort bags. LMSW has read and agrees with the RN initial assessment. LMSW will meet with pt and family to complete the SW assessment. Signed by: Shanda Prater       [facility] IDG Nurse Notes by Kaylie Ng RN at 07/08/20 1347  Version 2 of 2    Author:  Kaylie Ng RN Service:  NURSING Author Type:  Registered Nurse    Filed:  07/08/20 1617 Date of Service:  07/08/20 1347 Status:  Addendum    :  Kaylie Ng RN (Registered Nurse)    Related Notes:  Original Note by Kaylie Ng RN (Registered Nurse) filed at 07/08/20 0254       Patient: Jose L Freeze    Date: 07/08/20  Time: 1:47 PM    [facility] Nurse Notes  81 yo female arrived via [facility]. Dissecting aortic aneurysm. Pt is a DNR. Symptom management to include pain control, SOB, anxiety, agitation. Pt very SOB and appears in pain (FLACC 4/10) but states numeric level 0/10. Difficult time talking. Not alert but arousable to speech. Pt did not tolerate rolling very well at all and experienced a color change in face to dark red when rolling. Grimaced and expressed pain. O2 via NC at 2 L continuous. Pt is bed bound. Pt has had 11 falls this year and most recently ended up with fx left ribs and fx left arm. Pure wik in place. Attached to suction. Pt refuses a martínez catheter at this time. Per report, Pt is a [employer]. Karlene Peabody is local and first contact and was present at bedside on admission. Son, Swati Saavedra is present but is not local and should be called #2. Phone numbers verified. Admission complete and initial general hospice care plan initiated which includes spiritual, psychosocial, and bereavement. Immediate needs recognized for symptom management, family comfort, assessment of family and friend and patient EOL education. IDG team made aware of plan of care and immediate needs. Volunteer program suspended d/t COVID. Comfort bag provided to Pt by staff and companionship.      Signed by: Lorri Chiu RN     Report given to Charly Vail RN       [facility] IDG Nurse Notes by Gabo Warren James Loera RN at 07/08/20 1347  Version 1 of 2    Author:  Ignacio Doss RN Service:  NURSING Author Type:  Registered Nurse    Filed:  07/08/20 1525 Date of Service:  07/08/20 1347 Status:  Signed    :  Ignacio Doss RN (Registered Nurse)    Related Notes:  Addendum by Ignacio Doss RN (Registered Nurse) filed at 07/08/20 2227       Patient: Dylon Mike    Date: 07/08/20  Time: 1:47 PM    [address] [place] [address] Nurse Notes  79 yo female arrived via [facility]. Dissecting aortic aneurysm. Pt is a DNR. Symptom management to include pain control, SOB, anxiety, agitation. Pt very SOB and appears in pain (FLACC 4/10) but states numeric level 0/10. Difficult time talking. Not alert but arousable to speech. Pt did not tolerate rolling very well at all and experienced a color change in face to dark red when rolling. Grimaced and expressed pain. O2 via NC at 2 L continuous. Pt is bed bound. Pt has had 11 falls this year and most recently ended up with fx left ribs and fx left arm. Pure wik in place. Attached to suction. Pt refuses a martínez catheter at this time. Per report, Pt is a Zoroastrianism. Elan Roldan is local and first contact and was present at bedside on admission. Son, Laura Harman is present but is not local and should be called #2. Phone numbers verified. Admission complete and initial general hospice care plan initiated which includes spiritual, psychosocial, and bereavement. Immediate needs recognized for symptom management, family comfort, assessment of family and friend and patient EOL education. IDG team made aware of plan of care and immediate needs. Volunteer program suspended d/t COVID. Comfort bag provided to Pt by staff and companionship.      Signed by: Valentin Rao RN                Care Team Present:   Team Members Present: Physician, Nurse, , [employer]  Physician Team Member: Jhon Barrientos MD  Nurse Team Member: Carson GALAN  Social Work Team Member: [facility] Bubba Barrientos, [address] [facility] Team Member: Simon Nichols.  Div

## 2020-07-09 NOTE — PROGRESS NOTES
Problem: Falls - Risk of  Goal: *Absence of Falls  Description: Document Devere [place] Fall Risk and appropriate interventions in the flowsheet.   Outcome: Progressing Towards Goal  Note: Fall Risk Interventions:  Mobility Interventions: Bed/chair exit alarm    Mentation Interventions: Door open when patient unattended, Bed/chair exit alarm, Evaluate medications/consider consulting pharmacy    Medication Interventions: Bed/chair exit alarm, Evaluate medications/consider consulting pharmacy    Elimination Interventions: Call light in reach, Bed/chair exit alarm    History of Falls Interventions: Bed/chair exit alarm, Door open when patient unattended, Evaluate medications/consider consulting pharmacy

## 2020-07-09 NOTE — PROGRESS NOTES
Ms. Nickolas Guallpa attempted to speak with  but had much difficulty. When  told her 's name, she said, \" I had a Rowena\". That was the main part of the conversation. She appeared to be confused and had a great deal of difficulty pulling her thoughts together.  offered assurance of prayer.

## 2020-07-09 NOTE — [facility] IDG CHAPLAIN NOTES
Patient: Nusrat Jade    Date: 07/09/20  Time: 12:54 PM    [facility]  Notes    Assessment pending for spiritual and bereavement care.          Signed by: Ramakrishna Gibbons

## 2020-07-09 NOTE — [facility] IDG SOCIAL WORKER NOTES
Patient: Neftaly Hardy    Date: 07/09/20  Time: 8:24 AM    [facility]  Notes  The volunteer program has been suspended due to the Covid-19 virus. LMSW will ensure the pt and families receive their comfort bags. LMSW has read and agrees with the RN initial assessment. LMSW will meet with pt and family to complete the SW assessment.           Signed by: Madelyn Mcconnell
Patient: Patricia Altamirano    Date: 07/09/20  Time: 11:57 AM    [facility]  Notes  LMSW went to complete the SW assessment. Pt is unaccompanied at this time and unable to participate in assessment. LMSW will try again later and/or call family  The volunteer program has been suspended due to the Covid-19 virus.        Signed by: Gay Iyer
No masses; no nipple discharge

## 2020-07-09 NOTE — PROGRESS NOTES
Demographics     Information provided by: Darell Clark     Name:   Maegan Dai                                                             Level of Care  GIP [x] Routine  [] Respite   []           From the in home program Yes [] No [x]  Team                                                        Diagnosis Dissecting Aortic Aneurysm    Insurance    Medicare [x]   Medicaid  []  Blue Cross  []      Other []              Social    []   Single [x]     []    []    Spouse name  Length of marriage   Children: Pt has 2 children Mikie Cuellar and his spouse live in [place], Her daughter Sumaya Culp lives in [place] and is in very poor health herself. Community Resources Used in the Home prior to admission Yes []  No [x]      Financial Concerns :     No financial concerns                 Carlee Application Needed   Yes []  No [x]     Medicaid application needed Yes []  No [x]                                   IFA Form Complete  Yes [x]   No []    Discharge Plans:   Pt is very close to the end of her life. There is no need to plan for discharge. Work History :Pt worked as an     Retired [x] [employer] [] Part-time [] Disabled []        [employer] [employer]   Yes []  No [x]  [employer] []   [employer] [] [employer] [] [facility] []  [employer] []  [email or web address] []  Linked to [place]   Yes []  No [x]  Referral made to Zack[place] Yes [] No [x]        [address] in the system     Living Will  Yes  []  No [x]   HCPOA     Yes  []  No [x]   DPOA        Yes  []  No [x]  DNR           Yes [x]  No []       Spiritual / Roman Catholic Support: Devout member of [employer]. The [employer] is meeting via [place] right now and she was very active in teaching herself how to [place] so she could participate. Final Arrangements: Viky Erlin said she had previously told him that she had it all planned out. She has a cremation card in her important paperwork.   He is going to bring the information when he comes in to visit tonight. Medical History and/or Narrative copied from the patients chart from the [address] [place] [address] Physician or Rn:  79 yo female arrived via [facility]. Dissecting aortic aneurysm. Pt is a DNR. Symptom management to include pain control, SOB, anxiety, agitation. Pt very SOB and appears in pain (FLACC 4/10) but states numeric level 0/10. Difficult time talking. Not alert but arousable to speech. Pt did not tolerate rolling very well at all and experienced a color change in face to dark red when rolling. Grimaced and expressed pain. O2 via NC at 2 L continuous. Pt is bed bound. Pt has had 11 falls this year and most recently ended up with fx left ribs and fx left arm. Pure wik in place. Attached to suction. Pt refuses a martínez catheter at this time. Per report, Pt is a Lutheran. Dominique Leon is local and first contact and was present at bedside on admission. Son, [facility] is present but is not local and should be called #2. Phone numbers verified.      Admission complete and initial general hospice care plan initiated which includes spiritual, psychosocial, and bereavement. Immediate needs recognized for symptom management, family comfort, assessment of family and friend and patient EOL education. IDG team made aware of plan of care and immediate needs.      Volunteer program suspended d/t COVID. Comfort bag provided to Pt by staff and companionship. Mental Health History  No mental health concerns       Volunteer discussion: Yes []    No [x]The volunteer program has been suspended due to the Covid-19 virus. LMSW will ensure the pt and families receive their comfort bags. Goals of care for the patient and family: emotional support for her friends and family. Coping and Bereavement: They seem to be coping well at this time.                        Was a Referral made to Bereavement  Yes [] No [x]

## 2020-07-09 NOTE — PROGRESS NOTES
ARLENE took a call from the manager of the [employer]. She was telling me that she had talked to the family of Norma Donis and they told her she was here. She wanted to know what room she was in and when we thought she was going to be coming home. Apparently the pt has a dog in her apartment. ARLENE asked her for the privacy code. She was unable to provide the code so no information was given. ARLENE advised her that she should get any information she needs from the family.

## 2020-07-09 NOTE — PROGRESS NOTES
Problem: Falls - Risk of  Goal: *Absence of Falls  Description: Document Real Stoner Fall Risk and appropriate interventions in the flowsheet. Outcome: Progressing Towards Goal  Note: Fall Risk Interventions:  Mobility Interventions: Bed/chair exit alarm    Mentation Interventions: Door open when patient unattended, Bed/chair exit alarm, Evaluate medications/consider consulting pharmacy    Medication Interventions: Bed/chair exit alarm, Evaluate medications/consider consulting pharmacy    Elimination Interventions: Call light in reach, Bed/chair exit alarm    History of Falls Interventions: Bed/chair exit alarm, Door open when patient unattended, Evaluate medications/consider consulting pharmacy         Problem: Pressure Injury - Risk of  Goal: *Prevention of pressure injury  Description: Document Wilver Scale and appropriate interventions in the flowsheet.   Outcome: Progressing Towards Goal  Note: Pressure Injury Interventions:  Sensory Interventions: Assess changes in LOC, Check visual cues for pain, Float heels, Pressure redistribution bed/mattress (bed type), Keep linens dry and wrinkle-free, Minimize linen layers    Moisture Interventions: Absorbent underpads, Minimize layers, Moisture barrier, Limit adult briefs, Apply protective barrier, creams and emollients    Activity Interventions: Pressure redistribution bed/mattress(bed type)    Mobility Interventions: Float heels, Pressure redistribution bed/mattress (bed type)    Nutrition Interventions: Document food/fluid/supplement intake    Friction and Shear Interventions: Apply protective barrier, creams and emollients, Lift sheet, Minimize layers                Problem: Aide Supervisory Visit  Goal: Supervision of Home Health Aide  Outcome: Progressing Towards Goal     Problem: Potential for Skin Breakdown  Goal: Demonstrate ability to care for skin, monitor areas of breakdown and demonstrate methods to prevent breakdown  Description: Patient/family/caregiver will demonstrate ability to care for patient's skin, monitor for areas of breakdown, and demonstrate methods to prevent breakdown during hospice care. Outcome: Progressing Towards Goal     Problem: Risk for Falls  Goal: Free of falls during episode of care  Description: Patient will be free of falls during episode of care. Outcome: Progressing Towards Goal     Problem: Alteration in Mobility  Goal: Remain as independent as possible and remain safe in environment  Description: Patient will remain as independent as possible and remain safe in their environment. Outcome: Progressing Towards Goal     Problem: Comfort Deficit  Goal: Reduce/control pain  Description: Patient will report that pain has been reduced or controlled through verbal and nonverbal means and that measures to promote comfort are effective. Outcome: Progressing Towards Goal     Problem: Pain  Goal: Verbalize satisfaction of level of comfort and symptom control  Outcome: Progressing Towards Goal     Problem: Anxiety/Agitation  Goal: Verbalize and demonstrate ability to manage anxiety  Description: The patient/family/caregiver will verbalize and demonstrate ability to manage the patient's anxiety throughout hospice care.   Outcome: Progressing Towards Goal

## 2020-07-09 NOTE — PROGRESS NOTES
No transition of care outreach indicated due to patient discharge to hospice care. This note will not be viewable in [address]

## 2020-07-09 NOTE — PROGRESS NOTES
Rohit Olson 82 report from [facility], RN. Pt Id by name and . 193  Pt lying in bed with eyes closed. No moaning. No facial grimace. Flacc =0-1. Resp irreg, non labored on 2 L n/c. Lungs coarse. HR irreg. BS hypo. Abd distended and soft. Kapoor cath draining clear luis urine. No edema noted. SR up x 2. Bed low/locked. Call light with in reach. Door opened.   Peripheral line flushed with 3 ml ns. Flushed well. Dressing clean/dry/intact. Glycopyrrolate 0.2 mg IVP given for increased secretions. Pt tolerated well. 2348  Pt resting with eyes closed. No facial grimace. Flacc =0-1. Resp non labored irreg on 2 L n/c. SR up x 2. Bed low/locked. Call light with in reach. Door opened. 0015  Scheduled  Morphine 2 mg IVP given. Pt tolerated well. 0200  Pt resting with eyes closed. No facial grimace. Flacc =0-1. Resp non labored irreg on 2 L n/c. SR up x 2. Bed low/locked. Call light with in reach. Door opened. 4639  Pt resting with eyes closed. No facial grimace. Flacc =0-1. Resp non labored irreg on 2 L n/c. SR up x 2. Bed low/locked. Call light with in reach. Door opened. 0522  Pt alert. Mumbling incoherent speech. No facial grimace. Flacc =0-1. Resp non labored shallow on 2 L n/c. Scheduled morphine 2 mg IVP given. Pt tolerated well. SR up x 2. Bed low/locked. Call light with in reach. Door opened. Report given to Swetha Poe RN.

## 2020-07-09 NOTE — ADVANCED PRACTICE NURSE
IDG NOTE    Subjective:   Unresponsive. Copious secretions requiring suction multiple times. Catapres TTS-1 patch applied this morning to help lower blood pressure. Intake:  NPO. Scheduled medications:  Current Facility-Administered Medications   Medication Dose Route Frequency    cloNIDine (CATAPRES) 0.1 mg/24 hr patch 1 Patch (Patient Supplied)  1 Patch TransDERmal Q7D    sodium chloride (NS) flush 3 mL  3 mL IntraVENous Q12H    morphine injection 2 mg  2 mg SubCUTAneous Q6H    Or    morphine injection 2 mg  2 mg IntraVENous Q6H       PRN medications/symptoms:  Glycopyrrolate IV x 3 for secretions. Morphine 4mg IV x 2 for pain/dyspnea. Wounds:  N/A    Output: Kapoor catheter inserted this am. Tollie Luma removed last evening. IV access: right forearm PIV      Oxygen: 2  L/min via NC     Patient Vitals for the past 24 hrs:   Temp Pulse Resp BP   07/09/20 1024 -- 85 -- 139/74   07/09/20 0532 100 °F (37.8 °C) 82 10 103/54   07/08/20 1731 98.2 °F (36.8 °C) 90 16 125/67   07/08/20 1352 -- 67 -- 116/66       Changes in plan of care:  7/8: Add Morphine 2mg IV Q6. Catapres TTS 1 patch once weekly. 7/9: No changes in plan of care. Expect patient demise in the next 24 hours. Comprehensive plan of care reviewed. IDG and pt./family in agreement with plan of care. The IDG identifies through on-going assessment when a change is needed to the POC; the pt/family will receive care and services necessitated by changes in POC. Medications reviewed by the pharmacist and Medical Director.     Alejandro GALAN, NP-C  07/09/20

## 2020-07-09 NOTE — [facility] IDG NURSE NOTES
Patient: Dylon Mike    Date: 07/09/20  Time: 4:05 PM    [facility] Nurse Notes    IDG NOTE     Subjective:   Unresponsive. Copious secretions requiring suction multiple times. Catapres TTS-1 patch applied this morning to help lower blood pressure.      Intake:  NPO.      Scheduled medications:         Current Facility-Administered Medications   Medication Dose Route Frequency    cloNIDine (CATAPRES) 0.1 mg/24 hr patch 1 Patch (Patient Supplied)  1 Patch TransDERmal Q7D    sodium chloride (NS) flush 3 mL  3 mL IntraVENous Q12H    morphine injection 2 mg  2 mg SubCUTAneous Q6H     Or    morphine injection 2 mg  2 mg IntraVENous Q6H        PRN medications/symptoms:  Glycopyrrolate IV x 3 for secretions. Morphine 4mg IV x 2 for pain/dyspnea.      Wounds:  N/A     Output: Kapoor catheter inserted this am. Akash Knife removed last evening.      IV access: right forearm PIV       Oxygen: 2  L/min via NC      Patient Vitals for the past 24 hrs:    Temp Pulse Resp BP   07/09/20 1024 -- 85 -- 139/74   07/09/20 0532 100 °F (37.8 °C) 82 10 103/54   07/08/20 1731 98.2 °F (36.8 °C) 90 16 125/67   07/08/20 1352 -- 67 -- 116/66        Changes in plan of care:  7/8: Add Morphine 2mg IV Q6. Catapres TTS 1 patch once weekly. 7/9: No changes in plan of care. Expect patient demise in the next 24 hours.      Comprehensive plan of care reviewed. IDG and pt./family in agreement with plan of care. The IDG identifies through on-going assessment when a change is needed to the POC; the pt/family will receive care and services necessitated by changes in POC.  Medications reviewed by the pharmacist and Medical Director.     Jill GALAN, NP-C  07/09/20               Signed by: Ernesto Jensen

## 2020-07-09 NOTE — PROGRESS NOTES
Report received from off-going nurse,Rowena Sanford RN  visual identification made, assumed care of pt. Pt resting quietly with eyes closed, no agitation or restlessness, no grimacing or groaning. Pt respirations unlabored but pt has increased secretions, gurgling . Tab alert in place, rails up x 2, bed in lowest position, safety maintained. FLACC 0.    0800 pt sitting up in bed with head of bed elevated. And coarse gurgling respirations. Suctioned mouth for large amount of grey secretions. Physical assessment completed. Lung sounds coarse throughout, heart sounds regular, abdomen distended and firm, absent bowel sounds, extremities warm with palpable pulses. Pt eyes opened, asked her to cough, but pt clamped down on [place]. 1005 pt respirations irregular with long apneic pauses. 1037 pt repeating over and over \"In Jose name may we all be good. \"  Then mumbles something else. Pt gurgling with very moist cough. Administered glycopyrrolate. Placed clonodine patch on left arm     1233 administered morphine IV, pt asking where she is, told her she was at hospice house, pt states she's thirsty pt swallowed a few sips but coughed. 1450 pt resting quietly, no grimacing, dyspnea or gurgling     1600 pt resting quietly, with eyes closed. 31 75 62 with two assist transferred pt up in bed, administered scheduled morphine IVP. Pt has friend at bedside.      Report given to Elida Catherine RN

## 2020-07-09 NOTE — [facility] IDG MASTER NOTE
[facility]  Date: 07/09/20  Time: 8:43 AM    ___________________    Patient: Karina Reyes  Coverage Information:     Payor: [place]ARE     Plan: [facility] MEDICARE PART A AND B     Subscriber ID: [id number]     Phone Number:   MRN: [id number]  Current Benefit Period: Benefit Period 1  Start Date: 7/8/2020  End Date: 10/5/2020      Medical Director: Rosaura Salcedo MD   Hospice Attending Provider: Mendez Pearce MD  [address] [place] [address]  [id number]  Phone: [phone]  Fax: [phone]    Level of Care: General Inpatient Care      ___________________    Diagnoses: There were no encounter diagnoses.     Current Medications:    Current Facility-Administered Medications:     sodium chloride (NS) flush 3 mL, 3 mL, IntraVENous, Q12H, Emerson Aiken NP, 3 mL at 07/09/20 0804    sodium chloride (NS) flush 3 mL, 3 mL, IntraVENous, PRN, Emerson Aiken NP, 3 mL at 07/09/20 0547    haloperidol lactate (HALDOL) injection 2 mg, 2 mg, SubCUTAneous, Q1H PRN **OR** haloperidol lactate (HALDOL) injection 2 mg, 2 mg, IntraVENous, Q1H PRN, Emerson Aiken NP    acetaminophen (TYLENOL) suppository 650 mg, 650 mg, Rectal, Q3H PRN, Emerson Aiken NP    bisacodyL (DULCOLAX) suppository 10 mg, 10 mg, Rectal, PRN, Emerson Aiken NP    haloperidol lactate (HALDOL) injection 2 mg, 2 mg, SubCUTAneous, Q1H PRN **OR** haloperidol lactate (HALDOL) injection 2 mg, 2 mg, IntraVENous, Q1H PRN, Emerson Aiken NP    glycopyrrolate (ROBINUL) injection 0.2 mg, 0.2 mg, IntraVENous, Q4H PRN, Emerson Aiken NP, 0.2 mg at 07/09/20 0547    morphine injection 4 mg, 4 mg, IntraVENous, Q20MIN PRN **OR** morphine injection 4 mg, 4 mg, SubCUTAneous, Q20MIN PRN, Emerson Aiken NP, 4 mg at 07/08/20 1455    morphine injection 2 mg, 2 mg, SubCUTAneous, Q6H **OR** morphine injection 2 mg, 2 mg, IntraVENous, Q6H, Emerson Aiken NP, 2 mg at 07/09/20 6681    Orders:  Orders Placed This Encounter    IP CONSULT TO SPIRITUAL CARE Once on week one, then PRN. For [facility] patients only. For contracted patients, primary hospice will continue to manage spiritual care needs     Once on week one, then PRN. For [facility] patients only. For contracted patients, primary hospice will continue to manage spiritual care needs     Standing Status:   Standing     Number of Occurrences:   1     Order Specific Question:   Reason for Consult: Answer:   Spiritual crisis intervention or per patient or caregiver request    DIET PLEASURE     Standing Status:   Standing     Number of Occurrences:   1     Order Specific Question:   Likes/Dislikes/Preferences     Answer:   hold tray    VITAL SIGNS     Standing Status:   Standing     Number of Occurrences:   1    VITAL SIGNS     Standing Status:   Standing     Number of Occurrences:   1    NURSING-MISCELLANEOUS: comfort measures Enter comfort measures above. CONTINUOUS     Enter comfort measures above. Standing Status:   Standing     Number of Occurrences:   1     Order Specific Question:   Description of Order:     Answer:   comfort measures    GARCIA CATHETER, CARE     1. Garcia Catheter care every shift and PRN  2. Notify Physician of Garcia Catheter leakage, occlusion, gross adherent sediment or accidental removal  3. Change Garcia 30 days after insertion. 4. May flush catheter prn leakage or gross adherent sediment or mucus. Standing Status:   Standing     Number of Occurrences:   1    BLADDER CHECKS     May scan bladder PRN for urinary retention and or patient discomfort     Standing Status:   Standing     Number of Occurrences:   1    NURSING ASSESSMENT:  SPECIFY Assess for GIP, routine, or respite level of care. Q SHIFT Routine     Standing Status:   Standing     Number of Occurrences:   1     Order Specific Question:   Please describe the test or procedure you would like to order. Answer:   Assess for GIP, routine, or respite level of care.     PAIN ASSESSMENT Pain and Symptoms: Assess ever 4 hours and PRN, for GIP level of care. PRN Routine     Standing Status:   Standing     Number of Occurrences:   1     Order Specific Question:   Please describe the test or procedure you would like to order. Answer:   Pain and Symptoms: Assess ever 4 hours and PRN, for GIP level of care.  BEDREST, COMPLETE     Standing Status:   Standing     Number of Occurrences:   1    NURSING-MISCELLANEOUS: May leave purewick device in place to collect urine. CONTINUOUS     Standing Status:   Standing     Number of Occurrences:   1     Order Specific Question:   Description of Order:     Answer: May leave purewick device in place to collect urine.  NURSING-MISCELLANEOUS: admit 7/8: (SFE) Admitted with Dissecting aortic aneurysm for management of pain, dyspnea and agitation. This is an order to admit Tj Vasquez to inpatient hospice care at the [facility], for first 90-day benefi. .. 7/8: (SFE) Admitted with Dissecting aortic aneurysm for management of pain, dyspnea and agitation. This is an order to admit Tj Vasquez to inpatient hospice care at the [facility], for first 90-day benefit interval.  She is an 43-year-old woman who is experiencing a class I dissecting aortic aneurysm who has chosen against surgical intervention. She has requested and her POA has supported a plan to limit care to comfort measures only. Her life expectancy under the circumstances is less than 5 days. She is requiring large doses of parenteral opioids for pain management and anxiety. 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 Atherosclerotic heart disease status post myocardial infarction, essential hypertension, and dyslipidemia are related diagnoses.   Breast cancer survivorship, renal insufficiency and GERD are unrelated diagnoses which have a moderate adverse effect on this dismal prognosis. Standing Status:   Standing     Number of Occurrences:   1     Order Specific Question:   Description of Order:     Answer:   admit    DO NOT RESUSCITATE     Standing Status:   Standing     Number of Occurrences:   1    OXYGEN CANNULA Liters per minute: 2; Indications for O2 therapy: RESPIRATORY DISTRESS PRN Routine     Standing Status:   Standing     Number of Occurrences:   1     Order Specific Question:   Liters per minute: Answer:   2     Order Specific Question:   Indications for O2 therapy     Answer:   RESPIRATORY DISTRESS    morphine injection 4 mg    haloperidol lactate (HALDOL) injection 2 mg    sodium chloride (NS) flush 3 mL    sodium chloride (NS) flush 3 mL    OR Linked Order Group     haloperidol lactate (HALDOL) injection 2 mg     haloperidol lactate (HALDOL) injection 2 mg    acetaminophen (TYLENOL) suppository 650 mg    bisacodyL (DULCOLAX) suppository 10 mg    OR Linked Order Group     haloperidol lactate (HALDOL) injection 2 mg     haloperidol lactate (HALDOL) injection 2 mg    glycopyrrolate (ROBINUL) injection 0.2 mg    OR Linked Order Group     morphine injection 4 mg     morphine injection 4 mg    OR Linked Order Group     morphine injection 2 mg     morphine injection 2 mg    budesonide-formoteroL (SYMBICORT) 160-4.5 mcg/actuation HFAA     Sig: Take 2 Puffs by inhalation two (2) times a day.  DULoxetine (CYMBALTA) 60 mg capsule     Sig: Take 60 mg by mouth daily.  exemestane (AROMASIN) 25 mg tablet     Sig: Take 25 mg by mouth daily.  DISCONTD: metoprolol succinate (TOPROL-XL) 25 mg XL tablet     Sig: Take 12.5 mg by mouth daily.  nitroglycerin (NITROSTAT) 0.4 mg SL tablet     Si.4 mg by SubLINGual route every five (5) minutes as needed for Chest Pain.  traMADoL (ULTRAM) 50 mg tablet     Sig: Take 50 mg by mouth every eight (8) hours as needed for Pain.  INITIAL PHYSICIAN ORDER: HOSPICE Level Of Care: General Inpatient; Reason for Admission: 7/8: (SFE) Admitted with Dissecting aortic aneurysm for management of pain, dyspnea and agitation. Standing Status:   Standing     Number of Occurrences:   1     Order Specific Question:   Status     Answer:   Hospice     Order Specific Question:   Level Of Care     Answer:   General Inpatient     Order Specific Question:   Reason for Admission     Answer:   7/8: (SFE) Admitted with Dissecting aortic aneurysm for management of pain, dyspnea and agitation. Order Specific Question:   Inpatient Hospitalization Certified Necessary for the Following Reasons     Answer:   3. Patient receiving treatment that can only be provided in an inpatient setting (further clarification in H&P documentation)     Order Specific Question:   Admitting Diagnosis     Answer:   Dissecting aortic aneurysm [facility]) [[id number]]     Order Specific Question:   Terminal Prognosis Diagnosis(es)     Answer:   Dissecting aortic aneurysm [facility]) [[id number]]     Order Specific Question:   Admitting Physician     Answer:   Latoya Villalba     Order Specific Question:   Attending Physician     Answer:   Latoya Villalba     Order Specific Question:   Discharge Plan:     Answer: Other (Specify)    IP CONSULT TO SOCIAL WORK     Once on week one, then PRN for Psychosocial crisis intervention or per patient or caregiver request.  For [facility] patients only. For contracted patients, primary hospice will continue to manage social work needs. Standing Status:   Standing     Number of Occurrences:   1     Order Specific Question:   Reason for Consult: Answer: Once on week one, then PRN for Psychosocial crisis intervention or per patient or caregiver request.       Allergies:   Allergies   Allergen Reactions    Latex Rash    Penicillins Rash and Swelling     \"I look like someone beat me with a whip\"        Sulfa (Sulfonamide Antibiotics) Rash       Care Plan:  Multidisciplinary Problems (Active)     Problem: Aide Supervisory Visit     Dates: Start: 07/08/20       Description:     Disciplines: Interdisciplinary    Goal: Supervision of Home Health Aide     Dates: Start: 07/08/20       Description:     Disciplines: Interdisciplinary    Intervention: Aide supervisory visit     Dates: Start: 07/08/20       Description:                       Problem: Alteration in Mobility     Dates: Start: 07/08/20       Description:     Disciplines: Interdisciplinary    Goal: Remain as independent as possible and remain safe in environment     Dates: Start: 07/08/20       Description: Patient will remain as independent as possible and remain safe in their environment. Disciplines: Interdisciplinary    Intervention: Assess for deficits in mobility     Dates: Start: 07/08/20       Description:           Intervention: Instruct on mobility methods     Dates: Start: 07/08/20       Description: Instruct patient/caregiver on mobility methods. Intervention: PT to eval and treat     Dates: Start: 07/08/20       Description: Consult Physical Therapy for Evaluation and Treatment. Problem: Anticipatory Grief     Dates: Start: 07/08/20       Description:     Disciplines: Interdisciplinary    Goal: Explore reactions to and verbalize acceptance of impending loss     Dates: Start: 07/08/20       Description: Patient/family/caregiver will explore reactions to and verbalize acceptance of impending loss. Disciplines: Interdisciplinary    Intervention: Assess grief responses     Dates: Start: 07/08/20       Description:           Intervention: Assist with grief counseling     Dates: Start: 07/08/20       Description:  to assist.          Intervention: [place] Risejeb on stages of grief     Dates: Start: 07/08/20       Description: Instruct patient/caregiver on stages of grief.           Intervention: Offer grief support group     Dates: Start: 07/08/20       Description: Patient/family/caregiver will explore reactions to and verbalize acceptance of impending loss. Problem: Anxiety/Agitation     Dates: Start: 07/08/20       Description:     Disciplines: Interdisciplinary    Goal: Verbalize and demonstrate ability to manage anxiety     Dates: Start: 07/08/20       Description: The patient/family/caregiver will verbalize and demonstrate ability to manage the patient's anxiety throughout hospice care. Disciplines: Interdisciplinary    Intervention: Assess for anxiety/agitation     Dates: Start: 07/08/20       Description: Assess for signs and symptoms of anxiety and agitation. Intervention: Instruction strategies to reduce anxiety/agitation     Dates: Start: 07/08/20       Description: Instruct patient/caregiver on strategies to reduce anxiety/agitation. Problem: Comfort Deficit     Dates: Start: 07/08/20       Description:     Disciplines: Interdisciplinary    Goal: Reduce/control pain     Dates: Start: 07/08/20       Description: Patient will report that pain has been reduced or controlled through verbal and nonverbal means and that measures to promote comfort are effective. Disciplines: Interdisciplinary    Intervention: Alternative comfort measures     Dates: Start: 07/08/20       Description: Identify alternative comfort measures with patient/caregiver. Intervention: Instruct on sleeping positions for breathing comforts     Dates: Start: 07/08/20       Description: Instruct patient/caregiver on positions to aide in sleep breathing comfort.           Intervention: Monitor for symptoms of discomfort     Dates: Start: 07/08/20       Description:                       Problem: Communication Deficit     Dates: Start: 07/08/20       Description:     Disciplines: Interdisciplinary    Goal: Effectively communicate symptoms, needs, and concerns     Dates: Start: 07/08/20       Description: Patient/family/caregiver will effectively communicate symptoms, needs and concerns. Disciplines: Interdisciplinary    Intervention: Assess for deficit in communication     Dates: Start: 07/08/20       Description:           Intervention: Instruct on strategies to effectively comminicate     Dates: Start: 07/08/20       Description: Instruct patient/caregiver on strategies to effectively communicate. Problem: Coping and Emotional Distress     Dates: Start: 07/08/20       Description:     Disciplines: Interdisciplinary    Goal: Demonstrate acceptance of terminal illness and understanding of disease progression     Dates: Start: 07/08/20       Description: Patient/family/caregiver will demonstrate acceptance of terminal disease and understanding of disease progression while employing appropriate coping mechanisms. Disciplines: Interdisciplinary    Intervention: Assess for alteration in coping     Dates: Start: 07/08/20       Description:           Intervention: Assess for signs/symptoms of emotional distress     Dates: Start: 07/08/20       Description:           Intervention: Instruct on effective coping skills     Dates: Start: 07/08/20       Description: Instruct patient/caregiver on effective coping skills. Intervention: Instruct on strategies to reduce emotional distress     Dates: Start: 07/08/20       Description: Instruct patient/caregiver on strategies to reduce emotional distress. Problem: Depression     Dates: Start: 07/08/20       Description:     Disciplines: Interdisciplinary    Goal: Verbalize and demonstrate ability to manage depression     Dates: Start: 07/08/20       Description: The patient/family/caregiver will verbalize and demonstrate ability to manage patient's depression throughout hospice care.     Disciplines: Interdisciplinary    Intervention: Assess for signs/symptoms of depression     Dates: Start: 07/08/20       Description: Intervention: Instruct on non-pharmacological strategies to reduce depression     Dates: Start: 07/08/20       Description:           Intervention: Instruct on safe managment of antidepressants     Dates: Start: 07/08/20       Description:                       Problem: End of Life Process     Dates: Start: 07/08/20       Description:     Disciplines: Interdisciplinary    Goal: Demonstrate understanding of end of life processes     Dates: Start: 07/08/20   Expected End: 07/11/20       Description: Alesha Zavala will understand end of life processes. Disciplines: Interdisciplinary    Intervention: Assess for signs/symptoms of terminal restlessness     Dates: Start: 07/08/20       Description:           Intervention: Instruct on strategies to reduce terminal restlessness     Dates: Start: 07/08/20       Description:           Intervention: Instruct on the dying process     Dates: Start: 07/08/20       Description:           Intervention: Instruct: imminent death     Dates: Start: 07/08/20       Description:           Intervention: Instruct: process at end of life     Dates: Start: 07/08/20       Description:                       Problem: Falls - Risk of     Dates: Start: 07/08/20       Description:     Disciplines: Interdisciplinary    Goal: *Absence of Falls     Dates: Start: 07/08/20   Expected End: 07/11/20       Description: Document Phyllis Ruts Fall Risk and appropriate interventions in the flowsheet. Disciplines: Interdisciplinary                Problem: Home Safety     Dates: Start: 07/08/20       Description:     Disciplines: Interdisciplinary    Goal: Verbalize understanding of home safety measures; patient remains safe at home     Dates: Start: 07/08/20       Description: Alesha Zavala will verbalize understanding of home safety measures, and patient will remain safe at home.     Disciplines: Interdisciplinary    Intervention: Assess home environment     Dates: Start: 07/08/20       Description: Assess home environment for safety concerns. Intervention: Instruct on proper use of home oxygen     Dates: Start: 07/08/20       Description: Instruct patient/caregiver on proper use of home oxygen. Intervention: Instruct on restraint types     Dates: Start: 07/08/20       Description: Instruct caregiver on what constitutes a restraining device. Intervention: Instruct on strategies to reduce injury in the home     Dates: Start: 07/08/20       Description:                       Problem: Management of Home Medications     Dates: Start: 07/08/20       Description:     Disciplines: Interdisciplinary    Goal: Follow prescribed medication therapy/schedule, verbalize understanding of purpose/side effects of medication     Dates: Start: 07/08/20       Description: Patient/family/caregiver will follow prescribed medication therapy and schedule, and verbalize understanding of purpose and side effects of medication. Disciplines: Interdisciplinary    Intervention: Adminster medication as ordered     Dates: Start: 07/08/20       Description: Major Darby and instruct patient/caregiver to administer medication as ordered. Intervention: Assess for polypharmacy     Dates: Start: 07/08/20       Description: Assess medication regimen for appropriateness. Intervention: Assess for unsafe medication practices     Dates: Start: 07/08/20       Description:           Intervention: Assess medications     Dates: Start: 07/08/20       Description: Review and assess medication list; inclusive of OTC medications. Intervention: Instruct medication notifications     Dates: Start: 07/08/20       Description: Instruct patient/caregiver to notify home health agency and physician with any changes in medication therapy. Intervention: Instruct medications     Dates: Start: 07/08/20       Description: Provide detailed instruction on medications.           Intervention: Instruct on diabetic activity     Dates: Start: 07/08/20       Description:           Intervention: Lulu Reagan on high risk medications     Dates: Start: 07/08/20       Description: Instruct patient/caregiver on high-risk medications including oral hypoglycemics, insulins, blood thinners, and narcotics. Intervention: Instruct on how to fill medication box     Dates: Start: 07/08/20       Description:           Intervention: Instruct on management of home medications     Dates: Start: 07/08/20       Description: Instruct patient/caregiver on medication administration, purpose, dosage, preparation, scheduling, side effects, food/drug interactions, storage, and potential complications. Intervention: Instruct on management of medication problems     Dates: Start: 07/08/20       Description: Instruct patient/caregiver to monitor for side effects and adverse reactions. Intervention: Instruct on medication side effects     Dates: Start: 07/08/20       Description: Provide detail instruction for how and when to call to report problems regarding medications should they occur and to whom they should report. Intervention: Instruct on safe medication practices     Dates: Start: 07/08/20       Description: Instruct patient/caregiver on safe medication practices including safe disposal of medication. Intervention: Medication box     Dates: Start: 07/08/20       Description: Nurse should fill medication box weekly. Intervention: Monitor effectiveness of drug therapy     Dates: Start: 07/08/20       Description: Monitor effectiveness of patient's drug therapy by asking if they are experiencing any side effects of their medication. Intervention: Skilled assessment medications     Dates: Start: 07/08/20       Description: Assess patient/caregiver's ability to manage medications.   Assess patient/caregiver's knowledge of drug allergies, dose/route/ frequency, new or changed medications, classifications, food and drug interactions, and potential complications. Problem: Pain     Dates: Start: 07/08/20       Description:     Disciplines: Interdisciplinary    Goal: Verbalize satisfaction of level of comfort and symptom control     Dates: Start: 07/08/20   Expected End: 07/11/20       Description:     Disciplines: Interdisciplinary    Intervention: Assess effectiveness of pain management     Dates: Start: 07/08/20       Description:           Intervention: Assess for signs/symptoms of acute pain     Dates: Start: 07/08/20       Description:           Intervention: Assess for signs/symptoms of chronic pain     Dates: Start: 07/08/20       Description:           Intervention: Instruct on non-pharmacological pain management     Dates: Start: 07/08/20       Description:           Intervention: Instruct on pain scales     Dates: Start: 07/08/20       Description:           Intervention: Instruct on pharmacological pain management     Dates: Start: 07/08/20       Description:                       Problem: Patient Education: Go to Patient Education Activity     Dates: Start: 07/08/20       Description:     Disciplines: Interdisciplinary    Goal: Patient/Family Education     Dates: Start: 07/08/20       Description:     Disciplines: Interdisciplinary                Problem: Potential for Skin Breakdown     Dates: Start: 07/08/20       Description:     Disciplines: Interdisciplinary    Goal: Demonstrate ability to care for skin, monitor areas of breakdown and demonstrate methods to prevent breakdown     Dates: Start: 07/08/20   Expected End: 07/11/20       Description: Patient/family/caregiver will demonstrate ability to care for patient's skin, monitor for areas of breakdown, and demonstrate methods to prevent breakdown during hospice care.     Disciplines: Interdisciplinary    Intervention: Assess for skin breakdown     Dates: Start: 07/08/20       Description:           Intervention: Instruct on strategies to reduce risk of skin breakdown     Dates: Start: 07/08/20       Description:                       Problem: Pressure Injury - Risk of     Dates: Start: 07/08/20       Description:     Disciplines: Interdisciplinary    Goal: *Prevention of pressure injury     Dates: Start: 07/08/20       Description: Document Wilver Scale and appropriate interventions in the flowsheet. Disciplines: Interdisciplinary                Problem: Risk for Falls     Dates: Start: 07/08/20       Description:     Disciplines: Interdisciplinary    Goal: Free of falls during episode of care     Dates: Start: 07/08/20   Expected End: 07/11/20       Description: Patient will be free of falls during episode of care. Disciplines: Interdisciplinary    Intervention: Assess fall risk     Dates: Start: 07/08/20       Description: Complete fall risk assessment on admission, recertification, and as indicated on fall. Intervention: Instruct mobility     Dates: Start: 07/08/20       Description: Teach patient/caregiver/family techniques to increase patient's mobility: range of motion exercises, assisting with ambulation, proper usage of mobility assistive devices, use of side rails to define bed perimeter and to assist with turning and positioning. Intervention: Instruct on fall prevention     Dates: Start: 07/08/20       Description: Instruct patient/family in fall prevention strategies:  lighted hallways, remove throw rugs, monitor medication that may alter mental status. Problem: Spiritual Distress     Dates: Start: 07/08/20       Description:     Disciplines: Interdisciplinary    Goal: Distress heard, acknowledged, and addressed     Dates: Start: 07/08/20       Description: Patient/family/caregiver distress will be heard, acknowledged, and addressed throughout hospice care.     Disciplines: Interdisciplinary    Intervention: Assess for signs/symptoms of spiritual distress     Dates: Start: 07/08/20       Description:           Intervention: Consult on spiritual care     Dates: Start: 07/08/20       Description: Consult to Spiritual Care          Intervention: Discuss strategies to reduce spiritual distress     Dates: Start: 07/08/20       Description: Discuss with patient/caregiver strategies to reduce spiritual distress.                         Care Plan Problems/Goals      Progressing Towards Goal (10)      *Absence of Falls (Falls - Risk of)    Disciplines:  Interdisciplinary Expected end:  07/11/20        Outcome: Progressing Towards Goal By Nayeli Patterson RN on 07/09/20 6751            *Prevention of pressure injury (Pressure Injury - Risk of)    Disciplines:  Interdisciplinary Expected end:  -        Outcome: Progressing Towards Goal By Nayeli Patterson RN on 07/09/20 1364            Supervision of [address] (Aide Supervisory Visit)    Disciplines:  Interdisciplinary Expected end:  -        Outcome: Progressing Towards Goal By Nayeli Patterson RN on 07/09/20 9685            Demonstrate ability to care for skin, monitor areas of breakdown and demonstrate methods to prevent breakdown (Potential for Skin Breakdown)    Disciplines:  Interdisciplinary Expected end:  07/11/20        Outcome: Progressing Towards Goal By Nayeli Patterson RN on 07/09/20 2697            Free of falls during episode of care (Risk for Falls)    Disciplines:  Interdisciplinary Expected end:  07/11/20        Outcome: Progressing Towards Goal By Nayeli Patterson RN on 07/09/20 6426            Remain as independent as possible and remain safe in environment (Alteration in Mobility)    Disciplines:  Interdisciplinary Expected end:  -        Outcome: Progressing Towards Goal By Nayeli Patterson RN on 07/09/20 3678            Reduce/control pain (Comfort Deficit)    Disciplines:  Interdisciplinary Expected end:  -        Outcome: Progressing Towards Goal By Nayeli Patterson RN on 07/09/20 4833 Verbalize satisfaction of level of comfort and symptom control (Pain)    Disciplines:  Interdisciplinary Expected end:  07/11/20        Outcome: Progressing Towards Goal By Gemma Lafleur RN on 07/09/20 7820            Verbalize and demonstrate ability to manage anxiety (Anxiety/Agitation)    Disciplines:  Interdisciplinary Expected end:  -        Outcome: Progressing Towards Goal By Gemma Lafleur RN on 07/09/20 5990            Demonstrate understanding of end of life processes (End of Life Process)    Disciplines:  Interdisciplinary Expected end:  07/11/20        Outcome: Progressing Towards Goal By Merry Cope RN on 07/08/20 1527                         No Outcome (8)      Patient/Family Education (Patient Education: Go to Patient Education Activity)    Disciplines:  Interdisciplinary Expected end:  -          Verbalize understanding of home safety measures; patient remains safe at home (Home Safety)    Disciplines:  Interdisciplinary Expected end:  -          Follow prescribed medication therapy/schedule, verbalize understanding of purpose/side effects of medication (Management of Home Medications)    Disciplines:  Interdisciplinary Expected end:  -          Explore reactions to and verbalize acceptance of impending loss (Anticipatory Grief)    Disciplines:  Interdisciplinary Expected end:  -          Effectively communicate symptoms, needs, and concerns (Communication Deficit)    Disciplines:  Interdisciplinary Expected end:  -          Demonstrate acceptance of terminal illness and understanding of disease progression (Coping and Emotional Distress)    Disciplines:  Interdisciplinary Expected end:  -          Verbalize and demonstrate ability to manage depression (Depression)    Disciplines:  Interdisciplinary Expected end:  -          Distress heard, acknowledged, and addressed (Spiritual Distress)    Disciplines:  Interdisciplinary Expected end:  -                       Resolved/Met (2) Patient/Family Education (Patient Education: Go to Patient Education Activity)    Disciplines:  Interdisciplinary Expected end:  -        Outcome: Resolved/Met By Nakia Napoles RN on 07/08/20 1527            Demonstrate understanding of hospice philosophy, plan of care, and home hospice program [facility] Orientation)    Disciplines:  Interdisciplinary Expected end:  -        Outcome: Resolved/Met By Nakia Napoles RN on 07/08/20 1527                              ___________________    Care Team Notes          POC/IDG Notes      [facility] IDG  Notes by Sonny Mena at 07/09/20 0839  Version 1 of 1    Author:  Sonny Mena Service:  Spiritual Care Author Type:  Pastoral Care    Filed:  07/09/20 0840 Date of Service:  07/09/20 0839 Status:  Signed    :  Sonny Mena (Pastoral Care)       Patient: Delia Rhoades    Date: 07/09/20  Time: 8:39 AM    [facility]  Notes     has reviewed and agrees with the initial POC.  will provide spiritual care and assist with bereavement needs as appropriate.  will collaborate with Care Team to provide Good Help. Signed by: Robby WHITT [facility] IDG  Notes by Doyle Mcclain at 07/09/20 2686  Version 1 of 1    Author:  Doyle Mcclain Service:  Licensed Clinical  Author Type:      Filed:  07/09/20 0824 Date of Service:  07/09/20 0824 Status:  Signed    :  Doyle Mcclain ()       Patient: Delia Rhoades    Date: 07/09/20  Time: 8:24 AM    [facility]  Notes  The volunteer program has been suspended due to the Covid-19 virus. LMSW will ensure the pt and families receive their comfort bags. LMSW has read and agrees with the RN initial assessment. LMSW will meet with pt and family to complete the SW assessment.           Signed by: Joe Hunter       [facility] IDG Nurse Notes by Nakia Napoles RN at 07/08/20 1559  Version 2 of 2    Author: Reynaldo Allison RN Service:  NURSING Author Type:  Registered Nurse    Filed:  07/08/20 1617 Date of Service:  07/08/20 1347 Status:  Addendum    :  Reynaldo Allison RN (Registered Nurse)    Related Notes:  Original Note by Reynaldo Allison RN (Registered Nurse) filed at 07/08/20 1525       Patient: Guillermina Harp    Date: 07/08/20  Time: 1:47 PM    [address] Nurse Notes  81 yo female arrived via [facility]. Dissecting aortic aneurysm. Pt is a DNR. Symptom management to include pain control, SOB, anxiety, agitation. Pt very SOB and appears in pain (FLACC 4/10) but states numeric level 0/10. Difficult time talking. Not alert but arousable to speech. Pt did not tolerate rolling very well at all and experienced a color change in face to dark red when rolling. Grimaced and expressed pain. O2 via NC at 2 L continuous. Pt is bed bound. Pt has had 11 falls this year and most recently ended up with fx left ribs and fx left arm. Pure wik in place. Attached to suction. Pt refuses a martínez catheter at this time. Per report, Pt is a [employer]. Lisa Woods is local and first contact and was present at bedside on admission. Son, Cecil Sullivan is present but is not local and should be called #2. Phone numbers verified. Admission complete and initial general hospice care plan initiated which includes spiritual, psychosocial, and bereavement. Immediate needs recognized for symptom management, family comfort, assessment of family and friend and patient EOL education. IDG team made aware of plan of care and immediate needs. Volunteer program suspended d/t COVID. Comfort bag provided to Pt by staff and companionship.      Signed by: Kyle Caban RN     Report given to Nany Sierra RN       [address] IDG Nurse Notes by Reynaldo Allison RN at 07/08/20 9257  Version 1 of 2    Author:  Reynaldo Allison RN Service:  NURSING Author Type:  Registered Nurse    Filed:  07/08/20 1525 Date of Service:  07/08/20 1347 Status:  Signed :  Vero Hunter RN (Registered Nurse)    Related Notes:  Addendum by Vero Hunter RN (Registered Nurse) filed at 07/08/20 5418       Patient: Flower Contreras    Date: 07/08/20  Time: 1:47 PM    [facility] Nurse Notes  81 yo female arrived via [facility]. Dissecting aortic aneurysm. Pt is a DNR. Symptom management to include pain control, SOB, anxiety, agitation. Pt very SOB and appears in pain (FLACC 4/10) but states numeric level 0/10. Difficult time talking. Not alert but arousable to speech. Pt did not tolerate rolling very well at all and experienced a color change in face to dark red when rolling. Grimaced and expressed pain. O2 via NC at 2 L continuous. Pt is bed bound. Pt has had 11 falls this year and most recently ended up with fx left ribs and fx left arm. Pure wik in place. Attached to suction. Pt refuses a martínez catheter at this time. Per report, Pt is a Roman [employer]. Yolanda Jacksonceci is local and first contact and was present at bedside on admission. SonHarsh is present but is not local and should be called #2. Phone numbers verified. Admission complete and initial general hospice care plan initiated which includes spiritual, psychosocial, and bereavement. Immediate needs recognized for symptom management, family comfort, assessment of family and friend and patient EOL education. IDG team made aware of plan of care and immediate needs. Volunteer program suspended d/t COVID. Comfort bag provided to Pt by staff and companionship.      Signed by: Saba Power RN                Care Team Present:   Team Members Present: Physician, Nurse, , [employer]  Physician Team Member: Kofi Muhammad MD  Nurse Team Member: Kevin De Los Santos RN  Social Work Team Member: Pati Saeed, [address] [place] [address] Team Member: [facility]

## 2020-07-10 NOTE — PROGRESS NOTES
Death:      responded to family and friends following patient's death. Friends from her [employer] were present, a caregiver was there as well. After a few minutes her granddaughter arrived. She was very tearful. She said her grandmother had told her not to try and come visit. When patient was at [facility] there was a no visiting rule because of Covid 19. One of the ladies from the [employer] said they had slipped in to see patient at the facility.  offered emotional support and assurance of  Prayer.  called the Bereavement Coordinator. to make a connection with patient's granddaughter Tray Burkett. She arrived and provided emotional support and obtained contact information so she could follow through with granddaughter and other family members. Adam Chery

## 2020-07-10 NOTE — PROGRESS NOTES
Bereavement Visit:    Visit made in response to referral from [address] [place] [address]. Situation:  Patient very recently . Family and friends present include granddaughter Anna Murillo, for whom referral was made by Jil Holcomb, and L[employer] day friends, including one identifying self as \"adopted daughter\" of the patient. Background:  Patient was an 80year old female admitted on 20 with dx of Dissecting aortic aneurysm. Per , one of patient's daughters is , and was the mother of patient's granddaughter. Another daughter is dealing with a cancer diagnosis. (Daughter was not present at time of patient's death.)   Friends present at time of death seem to all be connected to patient's community of rosie. Assess/Response:   provided support for all present through expression of sympathy and introduction of self as bereavement coordinator. Primary bereavement issue/concern expressed by family friend and \"adopted daughter\" of the patient was concern for patient's granddaughter. Granddaughter Jarad Bryant was tearful, and was very receptive to Bereavement Coordinator's offer to be in touch soon via phone for grief support. Plan of care:  Plan made to follow up with patient's granddaughter via phone within a week. Contact info for granddaughter was obtained and will be entered into the EMR.

## 2020-07-10 NOTE — PROGRESS NOTES
Problem: Falls - Risk of  Goal: *Absence of Falls  Description: Document Bethany Reji Fall Risk and appropriate interventions in the flowsheet. Outcome: Progressing Towards Goal  Note: Fall Risk Interventions:  Mobility Interventions: Bed/chair exit alarm    Mentation Interventions: (lethargic)    Medication Interventions: Bed/chair exit alarm, Evaluate medications/consider consulting pharmacy    Elimination Interventions: Bed/chair exit alarm, Call light in reach    History of Falls Interventions: Bed/chair exit alarm, Door open when patient unattended, Evaluate medications/consider consulting pharmacy         Problem: Pressure Injury - Risk of  Goal: *Prevention of pressure injury  Description: Document Wilver Scale and appropriate interventions in the flowsheet. Outcome: Progressing Towards Goal  Note: Pressure Injury Interventions:  Sensory Interventions: Assess changes in LOC, Float heels, Minimize linen layers, Check visual cues for pain, Keep linens dry and wrinkle-free    Moisture Interventions: Absorbent underpads, Internal/External urinary devices, Minimize layers, Moisture barrier, Apply protective barrier, creams and emollients    Activity Interventions: Pressure redistribution bed/mattress(bed type)    Mobility Interventions: Pressure redistribution bed/mattress (bed type)    Nutrition Interventions: Document food/fluid/supplement intake    Friction and Shear Interventions: Apply protective barrier, creams and emollients, Minimize layers, Lift sheet                Problem: Potential for Skin Breakdown  Goal: Demonstrate ability to care for skin, monitor areas of breakdown and demonstrate methods to prevent breakdown  Description: Patient/family/caregiver will demonstrate ability to care for patient's skin, monitor for areas of breakdown, and demonstrate methods to prevent breakdown during hospice care.   Outcome: Progressing Towards Goal     Problem: Risk for Falls  Goal: Free of falls during episode of care  Description: Patient will be free of falls during episode of care. Outcome: Progressing Towards Goal     Problem: Pain  Goal: Verbalize satisfaction of level of comfort and symptom control  Outcome: Progressing Towards Goal     Problem: Anxiety/Agitation  Goal: Verbalize and demonstrate ability to manage anxiety  Description: The patient/family/caregiver will verbalize and demonstrate ability to manage the patient's anxiety throughout hospice care.   Outcome: Progressing Towards Goal

## 2020-07-10 NOTE — PROGRESS NOTES
8280- The patient report and walking rounds completed with Ashley Ndiaye RN. The patient is resting quietly in the bed. Patient identified by name and . The patient shows no signs of pain, SOB, nausea / vomit or anxiety. The bed is low and locked with two bed rails up and the call light within the reach of the patient. Tab alert is in place. 0800- The patient is awake and alert to person only. She ask if she may eat. Attempted to give patient yogurt and apple juice. The patient could not take any food in. Will continue to monitor. 9051- The patient is very anxious with respiratory distress. Haldol 2 mg IV for agitation and Morphine 4 mg IV for respiratory distress. Repositioned the patient for comfort. 9959- The patient is now resting quietly in the bed with no signs of distress observed. Her daughter in law arrived at the bed side and was given a report on how the night and morning went. She voiced understanding. 1020- The patient is no longer breathing. No obtainable vital signs. The daughter in law is at the bed side and voiced understanding that the patient has passed. The patient's son, Lorin Baez, was spoken to via telephone. He gave instruction for the body to go to [facility] I have attempted to contact this patient by phone with the following results: The provider and the  were notified of the death.

## 2020-07-13 LAB
COVID-19 RAPID TEST, COVR: NOT DETECTED
SARS-COV-2, COV2NT: NOT DETECTED
SOURCE, COVRS: NORMAL